# Patient Record
Sex: FEMALE | Race: WHITE | NOT HISPANIC OR LATINO | Employment: FULL TIME | URBAN - METROPOLITAN AREA
[De-identification: names, ages, dates, MRNs, and addresses within clinical notes are randomized per-mention and may not be internally consistent; named-entity substitution may affect disease eponyms.]

---

## 2021-10-01 ENCOUNTER — INITIAL PRENATAL (OUTPATIENT)
Dept: OBSTETRICS AND GYNECOLOGY | Facility: CLINIC | Age: 31
End: 2021-10-01

## 2021-10-01 VITALS — WEIGHT: 175 LBS | DIASTOLIC BLOOD PRESSURE: 72 MMHG | SYSTOLIC BLOOD PRESSURE: 115 MMHG

## 2021-10-01 DIAGNOSIS — Z3A.01 LESS THAN 8 WEEKS GESTATION OF PREGNANCY: Primary | ICD-10-CM

## 2021-10-01 DIAGNOSIS — B00.9 HERPES SIMPLEX TYPE 2 (HSV-2) INFECTION AFFECTING PREGNANCY, ANTEPARTUM: ICD-10-CM

## 2021-10-01 DIAGNOSIS — O98.519 HERPES SIMPLEX TYPE 2 (HSV-2) INFECTION AFFECTING PREGNANCY, ANTEPARTUM: ICD-10-CM

## 2021-10-01 PROBLEM — Z34.90 PREGNANCY: Status: ACTIVE | Noted: 2021-10-01

## 2021-10-01 PROCEDURE — 0501F PRENATAL FLOW SHEET: CPT | Performed by: OBSTETRICS & GYNECOLOGY

## 2021-10-01 RX ORDER — PRENATAL VIT NO.126/IRON/FOLIC 28MG-0.8MG
TABLET ORAL DAILY
COMMUNITY

## 2021-10-01 NOTE — PROGRESS NOTES
Initial ob visit     CC- Here for care of pregnancy        Elma Lima is a 30 y.o. female, , who presents for her first obstetrical visit.  Her last LMP was Patient's last menstrual period was 08/15/2021 (exact date)..    OB History    Para Term  AB Living   1             SAB TAB Ectopic Molar Multiple Live Births                    # Outcome Date GA Lbr Emeterio/2nd Weight Sex Delivery Anes PTL Lv   1 Current                Initial positive test date : 2021, UPT          Prior obstetric issues, potential pregnancy concerns: No  Family history of genetic issues (includes FOB): No  Prior infections concerning in pregnancy (Rash, fever in last 2 weeks): No  Varicella Hx -+  Prior testing for Cystic Fibrosis Carrier or Sickle Cell Trait- No  Prepregnancy BMI - There is no height or weight on file to calculate BMI.  History of STD: HSV  Ultrasound Today: Yes.  Findings showed viable iup.  I have personally evaluated the U/S and agree with the findings. Danya Lim MD    Additional Pertinent History   Last Pap : 2021   Last Completed Pap Smear     This patient has no relevant Health Maintenance data.        History of abnormal Pap smear: no  Family history of uterine, colon, breast, or ovarian cancer: yes - maternal grandmother- breast cancer  Feelings of Anxiety or Depression: no  Tobacco Usage?: No   Alcohol/Drug Use?: NO  Over the age of 35 at delivery: no  Desires Genetic Screening: Cell Free DNA  Flu Status: Declines    PMH  Past Medical History:   Diagnosis Date   • HSV-1 (herpes simplex virus 1) infection        Current Outpatient Medications:   •  prenatal vitamin (prenatal, CLASSIC, vitamin) tablet, Take  by mouth Daily., Disp: , Rfl:     The additional following portions of the patient's history were reviewed and updated as appropriate: allergies, current medications, past family history, past medical history, past social history, past surgical history and problem list.    Review of  Systems   Review of Systems  Current obstetric complaints : Nausea   All systems reviewed and otherwise normal.    I have reviewed and agree with the HPI, ROS, and historical information as entered above. Danya Lim MD    /72   Wt 79.4 kg (175 lb)   LMP 08/15/2021 (Exact Date)     Physical Exam  General:  well developed; well nourished  no acute distress   Chest/Respiratory: No labored breathing, normal respiratory effort, normal appearance, no respiratory noises noted   Heart:  normal rate, regular rhythm,  no murmurs, rubs, or gallops   Thyroid: normal to inspection and palpation   Breasts:  Not performed.   Abdomen: soft, non-tender; no masses  no umbilical or inguinal hernias are present  no hepato-splenomegaly   Pelvis: Not performed.        Assessment and Plan    Problem List Items Addressed This Visit     Herpes simplex type 2 (HSV-2) infection affecting pregnancy, antepartum    Pregnancy - Primary    Relevant Orders    Obstetric Panel    HIV-1 / O / 2 Ag / Antibody 4th Generation    Urine Culture - Urine, Urine, Clean Catch    Chlamydia trachomatis, Neisseria gonorrhoeae, PCR - , Urine, Clean Catch    Urine Drug Screen - Urine, Clean Catch          1. Pregnancy at 6w5d  2. Reviewed routine prenatal care with the office and educational materials given  3. Lab(s) Ordered  4. Patient is on Prenatal vitamins  5. Activity recommendation : 150 minutes/week of moderate intensity aerobic activity unless we limit for bleeding, hypertension or other pregnancy complication   6. plan hsv suppression 36 wks.   Return in about 1 month (around 11/1/2021) for F/U Prenatal.   rec covid vaccine      Danya Lim MD  10/01/2021

## 2021-10-05 LAB
ABO GROUP BLD: NORMAL
AMPHETAMINES UR QL SCN: NEGATIVE NG/ML
BACTERIA UR CULT: NO GROWTH
BACTERIA UR CULT: NORMAL
BARBITURATES UR QL SCN: NEGATIVE NG/ML
BASOPHILS # BLD AUTO: 0.1 X10E3/UL (ref 0–0.2)
BASOPHILS NFR BLD AUTO: 1 %
BENZODIAZ UR QL SCN: NEGATIVE NG/ML
BLD GP AB SCN SERPL QL: NEGATIVE
BZE UR QL SCN: NEGATIVE NG/ML
C TRACH RRNA SPEC QL NAA+PROBE: NEGATIVE
CANNABINOIDS UR QL SCN: NEGATIVE NG/ML
CREAT UR-MCNC: 66.5 MG/DL (ref 20–300)
EOSINOPHIL # BLD AUTO: 0.1 X10E3/UL (ref 0–0.4)
EOSINOPHIL NFR BLD AUTO: 1 %
ERYTHROCYTE [DISTWIDTH] IN BLOOD BY AUTOMATED COUNT: 13.3 % (ref 11.7–15.4)
HBV SURFACE AG SERPL QL IA: NEGATIVE
HCT VFR BLD AUTO: 37.1 % (ref 34–46.6)
HCV AB S/CO SERPL IA: <0.1 S/CO RATIO (ref 0–0.9)
HGB BLD-MCNC: 12.6 G/DL (ref 11.1–15.9)
HIV 1+2 AB+HIV1 P24 AG SERPL QL IA: NON REACTIVE
IMM GRANULOCYTES # BLD AUTO: 0 X10E3/UL (ref 0–0.1)
IMM GRANULOCYTES NFR BLD AUTO: 0 %
LABORATORY COMMENT REPORT: NORMAL
LYMPHOCYTES # BLD AUTO: 2.5 X10E3/UL (ref 0.7–3.1)
LYMPHOCYTES NFR BLD AUTO: 29 %
MCH RBC QN AUTO: 30.5 PG (ref 26.6–33)
MCHC RBC AUTO-ENTMCNC: 34 G/DL (ref 31.5–35.7)
MCV RBC AUTO: 90 FL (ref 79–97)
METHADONE UR QL SCN: NEGATIVE NG/ML
MONOCYTES # BLD AUTO: 0.5 X10E3/UL (ref 0.1–0.9)
MONOCYTES NFR BLD AUTO: 6 %
N GONORRHOEA RRNA SPEC QL NAA+PROBE: NEGATIVE
NEUTROPHILS # BLD AUTO: 5.3 X10E3/UL (ref 1.4–7)
NEUTROPHILS NFR BLD AUTO: 63 %
OPIATES UR QL SCN: NEGATIVE NG/ML
OXYCODONE+OXYMORPHONE UR QL SCN: NEGATIVE NG/ML
PCP UR QL: NEGATIVE NG/ML
PH UR: 7.6 [PH] (ref 4.5–8.9)
PLATELET # BLD AUTO: 302 X10E3/UL (ref 150–450)
PROPOXYPH UR QL SCN: NEGATIVE NG/ML
RBC # BLD AUTO: 4.13 X10E6/UL (ref 3.77–5.28)
RH BLD: NEGATIVE
RPR SER QL: NON REACTIVE
RUBV IGG SERPL IA-ACNC: 3.35 INDEX
WBC # BLD AUTO: 8.4 X10E3/UL (ref 3.4–10.8)

## 2021-10-20 ENCOUNTER — LAB (OUTPATIENT)
Dept: OBSTETRICS AND GYNECOLOGY | Facility: CLINIC | Age: 31
End: 2021-10-20

## 2021-10-20 DIAGNOSIS — Z3A.09 9 WEEKS GESTATION OF PREGNANCY: Primary | ICD-10-CM

## 2021-10-26 LAB
CFDNA.FET/CFDNA.TOTAL SFR FETUS: NORMAL %
CITATION REF LAB TEST: NORMAL
FET 13+18+21+X+Y ANEUP PLAS.CFDNA: NEGATIVE
FET CHR 21 TS PLAS.CFDNA QL: NEGATIVE
FET SEX PLAS.CFDNA DOSAGE CFDNA: NORMAL
FET TS 13 RISK PLAS.CFDNA QL: NEGATIVE
FET TS 18 RISK WBC.DNA+CFDNA QL: NEGATIVE
GA EST FROM CONCEPTION DATE: NORMAL D
GESTATIONAL AGE > 9:: YES
LAB DIRECTOR NAME PROVIDER: NORMAL
LAB DIRECTOR NAME PROVIDER: NORMAL
LABORATORY COMMENT REPORT: NORMAL
LIMITATIONS OF THE TEST: NORMAL
NEGATIVE PREDICTIVE VALUE: NORMAL
NOTE: NORMAL
PERFORMANCE CHARACTERISTICS: NORMAL
POSITIVE PREDICTIVE VALUE: NORMAL
REF LAB TEST METHOD: NORMAL
TEST PERFORMANCE INFO SPEC: NORMAL

## 2021-10-27 ENCOUNTER — ROUTINE PRENATAL (OUTPATIENT)
Dept: OBSTETRICS AND GYNECOLOGY | Facility: CLINIC | Age: 31
End: 2021-10-27

## 2021-10-27 VITALS — WEIGHT: 167 LBS | DIASTOLIC BLOOD PRESSURE: 78 MMHG | SYSTOLIC BLOOD PRESSURE: 120 MMHG

## 2021-10-27 DIAGNOSIS — B00.9 HERPES SIMPLEX TYPE 2 (HSV-2) INFECTION AFFECTING PREGNANCY, ANTEPARTUM: Primary | ICD-10-CM

## 2021-10-27 DIAGNOSIS — Z3A.10 10 WEEKS GESTATION OF PREGNANCY: ICD-10-CM

## 2021-10-27 DIAGNOSIS — O98.519 HERPES SIMPLEX TYPE 2 (HSV-2) INFECTION AFFECTING PREGNANCY, ANTEPARTUM: Primary | ICD-10-CM

## 2021-10-27 LAB
GLUCOSE UR STRIP-MCNC: NEGATIVE MG/DL
PROT UR STRIP-MCNC: NEGATIVE MG/DL

## 2021-10-27 PROCEDURE — 81002 URINALYSIS NONAUTO W/O SCOPE: CPT | Performed by: OBSTETRICS & GYNECOLOGY

## 2021-10-27 NOTE — PROGRESS NOTES
OB FOLLOW UP  CC- Here for care of pregnancy        Elma Lima is a 30 y.o.  10w3d patient being seen today for her obstetrical follow up visit. Patient reports no complaints.  All Labs reviewed, DOES NOT WANT TO KNOW THE SEX OF THE BABY.     Her prenatal care is complicated by (and status) :    Patient Active Problem List   Diagnosis   • Herpes simplex type 2 (HSV-2) infection affecting pregnancy, antepartum   • Pregnancy       Flu Status: Declines  Ultrasound Today: No.    ROS -   Patient Reports : No Problems  Patient Denies: Loss of Fluid, Vaginal Spotting, Vision Changes and Headaches  Fetal Movement : to early  All other systems reviewed and are negative.       The additional following portions of the patient's history were reviewed and updated as appropriate: past social history, past surgical history and problem list.    I have reviewed and agree with the HPI, ROS, and historical information as entered above. Danya Lim MD    /78   Wt 75.8 kg (167 lb)   LMP 08/15/2021 (Exact Date)       EXAM:     FHT: + BPM   Uterine Size: size equals dates  Pelvic Exam: No    Urine glucose/protein: See prenatal flowsheet       Assessment and Plan    Problem List Items Addressed This Visit     Herpes simplex type 2 (HSV-2) infection affecting pregnancy, antepartum - Primary    Overview     Plan suppression 36 wks         Relevant Orders    POC Urinalysis Dipstick (Completed)    Pregnancy    Overview     cfDNA low risk               1. Pregnancy at 10w3d. Prenatal labs reviewed, cfDNA low risk.   2. Fetal status reassuring.   3. Activity and Exercise discussed.  Return in about 1 month (around 2021) for F/U Prenatal.    Danya Lim MD  10/27/2021

## 2021-11-24 ENCOUNTER — ROUTINE PRENATAL (OUTPATIENT)
Dept: OBSTETRICS AND GYNECOLOGY | Facility: CLINIC | Age: 31
End: 2021-11-24

## 2021-11-24 VITALS — SYSTOLIC BLOOD PRESSURE: 110 MMHG | DIASTOLIC BLOOD PRESSURE: 78 MMHG | WEIGHT: 166 LBS

## 2021-11-24 DIAGNOSIS — Z3A.14 14 WEEKS GESTATION OF PREGNANCY: Primary | ICD-10-CM

## 2021-11-24 DIAGNOSIS — O98.519 HERPES SIMPLEX TYPE 2 (HSV-2) INFECTION AFFECTING PREGNANCY, ANTEPARTUM: ICD-10-CM

## 2021-11-24 DIAGNOSIS — B00.9 HERPES SIMPLEX TYPE 2 (HSV-2) INFECTION AFFECTING PREGNANCY, ANTEPARTUM: ICD-10-CM

## 2021-11-24 LAB
GLUCOSE UR STRIP-MCNC: NEGATIVE MG/DL
PROT UR STRIP-MCNC: NEGATIVE MG/DL

## 2021-11-24 PROCEDURE — 0502F SUBSEQUENT PRENATAL CARE: CPT | Performed by: OBSTETRICS & GYNECOLOGY

## 2021-11-24 NOTE — PROGRESS NOTES
OB FOLLOW UP  CC- Here for care of pregnancy        Elma Lima is a 31 y.o.  14w3d patient being seen today for her obstetrical follow up visit. Patient reports no complaints.     Her prenatal care is complicated by (and status) :    Patient Active Problem List   Diagnosis   • Herpes simplex type 2 (HSV-2) infection affecting pregnancy, antepartum   • Pregnancy       Flu Status: Desires at future appt  Ultrasound Today: No.    ROS -   Patient Reports : No Problems  Patient Denies: Loss of Fluid and Vaginal Spotting  Fetal Movement : normal  All other systems reviewed and are negative.       The additional following portions of the patient's history were reviewed and updated as appropriate: allergies and current medications.    I have reviewed and agree with the HPI, ROS, and historical information as entered above. Danya Lim MD    /78   Wt 75.3 kg (166 lb)   LMP 08/15/2021 (Exact Date)       EXAM:     FHT: + BPM   Uterine Size: size equals dates  Pelvic Exam: No    Urine glucose/protein: See prenatal flowsheet       Assessment and Plan    Problem List Items Addressed This Visit     Herpes simplex type 2 (HSV-2) infection affecting pregnancy, antepartum    Overview     Plan suppression 36 wks         Relevant Orders    POC Urinalysis Dipstick (Completed)    Pregnancy - Primary    Overview     cfDNA low risk               1. Pregnancy at 14w3d. cfDNA low risk. Rehana US next time.   2. Fetal status reassuring.   3. Activity and Exercise discussed.  Return in about 5 weeks (around 2021) for F/U Prenatal, U/S Next Visit.    Danya Lim MD  2021

## 2021-12-29 ENCOUNTER — ROUTINE PRENATAL (OUTPATIENT)
Dept: OBSTETRICS AND GYNECOLOGY | Facility: CLINIC | Age: 31
End: 2021-12-29

## 2021-12-29 VITALS — SYSTOLIC BLOOD PRESSURE: 118 MMHG | WEIGHT: 164 LBS | DIASTOLIC BLOOD PRESSURE: 80 MMHG

## 2021-12-29 DIAGNOSIS — B00.9 HERPES SIMPLEX TYPE 2 (HSV-2) INFECTION AFFECTING PREGNANCY, ANTEPARTUM: ICD-10-CM

## 2021-12-29 DIAGNOSIS — O98.519 HERPES SIMPLEX TYPE 2 (HSV-2) INFECTION AFFECTING PREGNANCY, ANTEPARTUM: ICD-10-CM

## 2021-12-29 DIAGNOSIS — Z3A.19 19 WEEKS GESTATION OF PREGNANCY: Primary | ICD-10-CM

## 2021-12-29 LAB
EXPIRATION DATE: 0
GLUCOSE UR STRIP-MCNC: NEGATIVE MG/DL
Lab: 0
PROT UR STRIP-MCNC: NEGATIVE MG/DL

## 2021-12-29 PROCEDURE — 0502F SUBSEQUENT PRENATAL CARE: CPT | Performed by: NURSE PRACTITIONER

## 2021-12-29 NOTE — PROGRESS NOTES
OB FOLLOW UP  CC- Here for care of pregnancy        Elma Lima is a 31 y.o.  19w3d patient being seen today for her obstetrical follow up visit. Patient reports heartburn. Patient taking Tums as needed.     Her prenatal care is complicated by (and status) :    Patient Active Problem List   Diagnosis   • Herpes simplex type 2 (HSV-2) infection affecting pregnancy, antepartum   • Pregnancy       Flu Status: Declines  Ultrasound Today: Yes.  Findings showed wnl.  I have personally evaluated the U/S and agree with the findings. Olena Chand, APRN    ROS -   Patient Reports : No Problems  Patient Denies: Loss of Fluid, Vaginal Spotting, Vision Changes, Headaches, Nausea , Vomiting , Contractions and Epigastric pain  Fetal Movement : Absent   All other systems reviewed and are negative.       The additional following portions of the patient's history were reviewed and updated as appropriate: allergies and current medications.    I have reviewed and agree with the HPI, ROS, and historical information as entered above. Olena Chand, APRN    /80   Wt 74.4 kg (164 lb)   LMP 08/15/2021 (Exact Date)       EXAM:     FHT:  See US      Urine glucose/protein: See prenatal flowsheet       Assessment and Plan    Problem List Items Addressed This Visit        Gravid and     Pregnancy - Primary    Overview     cfDNA low risk         Relevant Orders    POC Glucose, Urine, Qualitative, Dipstick (Completed)    POC Protein, Urine, Qualitative, Dipstick (Completed)       Other    Herpes simplex type 2 (HSV-2) infection affecting pregnancy, antepartum    Overview     Plan suppression 36 wks               1. Pregnancy at 19w3d  2. Fetal status reassuring.   3. Activity and Exercise discussed.  Return in about 4 weeks (around 2022).   Declines AFP only    ZENON Contreras  2021

## 2022-01-26 ENCOUNTER — ROUTINE PRENATAL (OUTPATIENT)
Dept: OBSTETRICS AND GYNECOLOGY | Facility: CLINIC | Age: 32
End: 2022-01-26

## 2022-01-26 VITALS — DIASTOLIC BLOOD PRESSURE: 80 MMHG | WEIGHT: 169.2 LBS | SYSTOLIC BLOOD PRESSURE: 122 MMHG

## 2022-01-26 DIAGNOSIS — O98.519 HERPES SIMPLEX TYPE 2 (HSV-2) INFECTION AFFECTING PREGNANCY, ANTEPARTUM: ICD-10-CM

## 2022-01-26 DIAGNOSIS — B00.9 HERPES SIMPLEX TYPE 2 (HSV-2) INFECTION AFFECTING PREGNANCY, ANTEPARTUM: ICD-10-CM

## 2022-01-26 DIAGNOSIS — Z3A.23 23 WEEKS GESTATION OF PREGNANCY: Primary | ICD-10-CM

## 2022-01-26 LAB
EXPIRATION DATE: 0
GLUCOSE UR STRIP-MCNC: NEGATIVE MG/DL
Lab: 0
PROT UR STRIP-MCNC: NEGATIVE MG/DL

## 2022-01-26 PROCEDURE — 0502F SUBSEQUENT PRENATAL CARE: CPT | Performed by: OBSTETRICS & GYNECOLOGY

## 2022-01-26 NOTE — PROGRESS NOTES
OB FOLLOW UP  CC- Here for care of pregnancy        Elma Lima is a 31 y.o.  23w3d patient being seen today for her obstetrical follow up visit. Patient reports heartburn. Patient is taking Tums for relief. Patient also reports that since last night she had some pain/tension at the top of her belly. Patient states that it woke her from sleep and pain radiated to the middle of her back. Patient states that she applied pressure under her rib cage and that helped relieve some of the tension. She states this lasted about 45 minutes and was an isolated event.     Gave patient 28 week instruction paper.     Her prenatal care is complicated by (and status) :    Patient Active Problem List   Diagnosis   • Herpes simplex type 2 (HSV-2) infection affecting pregnancy, antepartum   • Pregnancy       Flu Status: Declines  Ultrasound Today: No.    ROS -   Patient Reports : No Problems  Patient Denies: Loss of Fluid, Vaginal Spotting, Vision Changes, Headaches, Nausea , Vomiting , Contractions and Epigastric pain  Fetal Movement : normal  All other systems reviewed and are negative.       The additional following portions of the patient's history were reviewed and updated as appropriate: allergies and current medications.    I have reviewed and agree with the HPI, ROS, and historical information as entered above. Danya Lim MD    /80   Wt 76.7 kg (169 lb 3.2 oz)   LMP 08/15/2021 (Exact Date)       EXAM:     FHT: + BPM   Uterine Size: size equals dates  Pelvic Exam: No    Urine glucose/protein: See prenatal flowsheet       Assessment and Plan    Problem List Items Addressed This Visit     Herpes simplex type 2 (HSV-2) infection affecting pregnancy, antepartum    Overview     Plan suppression 36 wks         Pregnancy - Primary    Overview     cfDNA low risk         Relevant Orders    POC Glucose, Urine, Qualitative, Dipstick (Completed)    POC Protein, Urine, Qualitative, Dipstick (Completed)          1. Pregnancy  at 23w3d. glucola next time.   2. Fetal status reassuring.   3. Activity and Exercise discussed.  Return in about 1 month (around 2/26/2022) for F/U Prenatal, and glucola.    Danya Lim MD  01/26/2022

## 2022-02-23 ENCOUNTER — ROUTINE PRENATAL (OUTPATIENT)
Dept: OBSTETRICS AND GYNECOLOGY | Facility: CLINIC | Age: 32
End: 2022-02-23

## 2022-02-23 VITALS — WEIGHT: 168 LBS | SYSTOLIC BLOOD PRESSURE: 120 MMHG | DIASTOLIC BLOOD PRESSURE: 70 MMHG

## 2022-02-23 DIAGNOSIS — O26.893 RH NEGATIVE STATUS DURING PREGNANCY IN THIRD TRIMESTER: Primary | ICD-10-CM

## 2022-02-23 DIAGNOSIS — Z67.91 RH NEGATIVE STATUS DURING PREGNANCY IN THIRD TRIMESTER: Primary | ICD-10-CM

## 2022-02-23 DIAGNOSIS — O98.519 HERPES SIMPLEX TYPE 2 (HSV-2) INFECTION AFFECTING PREGNANCY, ANTEPARTUM: ICD-10-CM

## 2022-02-23 DIAGNOSIS — B00.9 HERPES SIMPLEX TYPE 2 (HSV-2) INFECTION AFFECTING PREGNANCY, ANTEPARTUM: ICD-10-CM

## 2022-02-23 DIAGNOSIS — Z3A.27 27 WEEKS GESTATION OF PREGNANCY: Primary | ICD-10-CM

## 2022-02-23 LAB
GLUCOSE UR STRIP-MCNC: NEGATIVE MG/DL
PROT UR STRIP-MCNC: NEGATIVE MG/DL

## 2022-02-23 PROCEDURE — 96372 THER/PROPH/DIAG INJ SC/IM: CPT | Performed by: NURSE PRACTITIONER

## 2022-02-23 PROCEDURE — 0502F SUBSEQUENT PRENATAL CARE: CPT | Performed by: OBSTETRICS & GYNECOLOGY

## 2022-02-23 NOTE — PROGRESS NOTES
OB FOLLOW UP  CC- Here for care of pregnancy        Elma Lima is a 31 y.o.  27w3d patient being seen today for her obstetrical follow up visit. Patient reports no complaints. GTT/Rhogam today. ? TDAP next visit.     Her prenatal care is complicated by (and status) :    Patient Active Problem List   Diagnosis   • Herpes simplex type 2 (HSV-2) infection affecting pregnancy, antepartum   • Pregnancy       Flu Status: Declines  Ultrasound Today: No.    ROS -   Patient Denies: Loss of Fluid, Vaginal Spotting, Vision Changes, Headaches, Nausea , Vomiting  and Contractions  Fetal Movement : normal  All other systems reviewed and are negative.       The additional following portions of the patient's history were reviewed and updated as appropriate: allergies, current medications, past family history, past medical history, past social history, past surgical history and problem list.    I have reviewed and agree with the HPI, ROS, and historical information as entered above. Danya Lim MD    /70   Wt 76.2 kg (168 lb)   LMP 08/15/2021 (Exact Date)       EXAM:     FHT: + BPM   Uterine Size: size equals dates  Pelvic Exam: No    Urine glucose/protein: See prenatal flowsheet       Assessment and Plan    Problem List Items Addressed This Visit     Herpes simplex type 2 (HSV-2) infection affecting pregnancy, antepartum    Overview     Plan suppression 36 wks         Pregnancy - Primary    Overview     cfDNA low risk         Relevant Orders    Gestational Screen 1 Hr ([facility])    CBC (No Diff)    Antibody Screen    POC Urinalysis Dipstick (Completed)          1. Pregnancy at 27w3d. Glucola, rhogam today. tdap next time.   2. FH normal, but no weight gain this pregnancy, will cont to monitor.   3. Fetal status reassuring.   4. Activity and Exercise discussed.  Return in about 1 month (around 3/23/2022) for F/U Prenatal.    Danya Lim MD  2022

## 2022-02-24 LAB
BLD GP AB SCN SERPL QL: NEGATIVE
ERYTHROCYTE [DISTWIDTH] IN BLOOD BY AUTOMATED COUNT: 12.6 % (ref 11.7–15.4)
GLUCOSE 1H P 50 G GLC PO SERPL-MCNC: 130 MG/DL (ref 65–139)
HCT VFR BLD AUTO: 33.2 % (ref 34–46.6)
HGB BLD-MCNC: 11.2 G/DL (ref 11.1–15.9)
MCH RBC QN AUTO: 31.6 PG (ref 26.6–33)
MCHC RBC AUTO-ENTMCNC: 33.7 G/DL (ref 31.5–35.7)
MCV RBC AUTO: 94 FL (ref 79–97)
PLATELET # BLD AUTO: 269 X10E3/UL (ref 150–450)
RBC # BLD AUTO: 3.54 X10E6/UL (ref 3.77–5.28)
WBC # BLD AUTO: 10.8 X10E3/UL (ref 3.4–10.8)

## 2022-03-23 ENCOUNTER — ROUTINE PRENATAL (OUTPATIENT)
Dept: OBSTETRICS AND GYNECOLOGY | Facility: CLINIC | Age: 32
End: 2022-03-23

## 2022-03-23 VITALS — SYSTOLIC BLOOD PRESSURE: 120 MMHG | WEIGHT: 170 LBS | DIASTOLIC BLOOD PRESSURE: 80 MMHG

## 2022-03-23 DIAGNOSIS — Z3A.31 31 WEEKS GESTATION OF PREGNANCY: ICD-10-CM

## 2022-03-23 DIAGNOSIS — O26.10 LOW WEIGHT GAIN DURING PREGNANCY, ANTEPARTUM: ICD-10-CM

## 2022-03-23 DIAGNOSIS — B00.9 HERPES SIMPLEX TYPE 2 (HSV-2) INFECTION AFFECTING PREGNANCY, ANTEPARTUM: Primary | ICD-10-CM

## 2022-03-23 DIAGNOSIS — O98.519 HERPES SIMPLEX TYPE 2 (HSV-2) INFECTION AFFECTING PREGNANCY, ANTEPARTUM: Primary | ICD-10-CM

## 2022-03-23 LAB
GLUCOSE UR STRIP-MCNC: NEGATIVE MG/DL
PROT UR STRIP-MCNC: NEGATIVE MG/DL

## 2022-03-23 PROCEDURE — 0502F SUBSEQUENT PRENATAL CARE: CPT | Performed by: OBSTETRICS & GYNECOLOGY

## 2022-03-23 PROCEDURE — 90471 IMMUNIZATION ADMIN: CPT | Performed by: OBSTETRICS & GYNECOLOGY

## 2022-03-23 PROCEDURE — 90715 TDAP VACCINE 7 YRS/> IM: CPT | Performed by: OBSTETRICS & GYNECOLOGY

## 2022-03-23 NOTE — PROGRESS NOTES
OB FOLLOW UP  CC- Here for care of pregnancy        Elma Lima is a 31 y.o.  31w3d patient being seen today for her obstetrical follow up visit. Patient reports no complaints. 28wk labs reviewed. TDAP given today.      Her prenatal care is complicated by (and status) :    Patient Active Problem List   Diagnosis   • Herpes simplex type 2 (HSV-2) infection affecting pregnancy, antepartum   • Pregnancy         Ultrasound Today: No.    ROS -     Patient Denies: Loss of Fluid, Vaginal Spotting, Vision Changes, Headaches, Nausea , Vomiting  and Contractions  Fetal Movement : normal  All other systems reviewed and are negative.       The additional following portions of the patient's history were reviewed and updated as appropriate: allergies, current medications, past family history, past medical history, past social history, past surgical history and problem list.    I have reviewed and agree with the HPI, ROS, and historical information as entered above. Danya Lim MD    /80   Wt 77.1 kg (170 lb)   LMP 08/15/2021 (Exact Date)       EXAM:     FHT: + BPM   Uterine Size: size equals dates  Pelvic Exam: No    Urine glucose/protein: See prenatal flowsheet       Assessment and Plan    Problem List Items Addressed This Visit     Herpes simplex type 2 (HSV-2) infection affecting pregnancy, antepartum - Primary    Overview     Plan suppression 36 wks           Pregnancy    Overview     cfDNA low risk           Relevant Orders    POC Urinalysis Dipstick (Completed)      Other Visit Diagnoses     Low weight gain during pregnancy, antepartum              1. Pregnancy at 31w3d. Check growth next time for low maternal weight gain.   2. Fetal status reassuring.   3. Activity and Exercise discussed.  Return in about 2 weeks (around 2022) for F/U Prenatal, U/S Next Visit.    Danya Lim MD  2022

## 2022-04-06 ENCOUNTER — ROUTINE PRENATAL (OUTPATIENT)
Dept: OBSTETRICS AND GYNECOLOGY | Facility: CLINIC | Age: 32
End: 2022-04-06

## 2022-04-06 VITALS — SYSTOLIC BLOOD PRESSURE: 128 MMHG | WEIGHT: 173.8 LBS | DIASTOLIC BLOOD PRESSURE: 78 MMHG

## 2022-04-06 DIAGNOSIS — Z3A.33 33 WEEKS GESTATION OF PREGNANCY: Primary | ICD-10-CM

## 2022-04-06 DIAGNOSIS — O98.519 HERPES SIMPLEX TYPE 2 (HSV-2) INFECTION AFFECTING PREGNANCY, ANTEPARTUM: ICD-10-CM

## 2022-04-06 DIAGNOSIS — B00.9 HERPES SIMPLEX TYPE 2 (HSV-2) INFECTION AFFECTING PREGNANCY, ANTEPARTUM: ICD-10-CM

## 2022-04-06 LAB
EXPIRATION DATE: 0
GLUCOSE UR STRIP-MCNC: NEGATIVE MG/DL
Lab: 0
PROT UR STRIP-MCNC: NEGATIVE MG/DL

## 2022-04-06 PROCEDURE — 0502F SUBSEQUENT PRENATAL CARE: CPT | Performed by: OBSTETRICS & GYNECOLOGY

## 2022-04-06 NOTE — PROGRESS NOTES
OB FOLLOW UP  CC- Here for care of pregnancy        Elma Lima is a 31 y.o.  33w3d patient being seen today for her obstetrical follow up visit. Patient reports no complaints.     Her prenatal care is complicated by (and status) :    Patient Active Problem List   Diagnosis   • Herpes simplex type 2 (HSV-2) infection affecting pregnancy, antepartum   • Pregnancy     Ultrasound Today: Yes.  Findings showed nl growth and fluid.  I have personally evaluated the U/S and agree with the findings.     ROS -   Patient Reports : No Problems  Patient Denies: Loss of Fluid, Vaginal Spotting, Vision Changes, Headaches, Nausea , Vomiting , Contractions and Epigastric pain  Fetal Movement : normal  All other systems reviewed and are negative.       The additional following portions of the patient's history were reviewed and updated as appropriate: allergies and current medications.    I have reviewed and agree with the HPI, ROS, and historical information as entered above. Danya Lim MD    /78   Wt 78.8 kg (173 lb 12.8 oz)   LMP 08/15/2021 (Exact Date)       EXAM:     FHT: + BPM   Uterine Size: size equals dates  Pelvic Exam: No    Urine glucose/protein: See prenatal flowsheet       Assessment and Plan    Problem List Items Addressed This Visit     Herpes simplex type 2 (HSV-2) infection affecting pregnancy, antepartum    Overview     Plan suppression 36 wks           Pregnancy - Primary    Overview     cfDNA low risk  EFW 33 wks 63%ile           Relevant Orders    POC Glucose, Urine, Qualitative, Dipstick (Completed)    POC Protein, Urine, Qualitative, Dipstick (Completed)          1. Pregnancy at 33w3d. US today EFW 63%ile. Vtx, RICHA nl.   2. Fetal status reassuring.   3. Activity and Exercise discussed.  Return in about 2 weeks (around 2022) for F/U Prenatal.    Danya Lim MD  2022

## 2022-04-20 ENCOUNTER — ROUTINE PRENATAL (OUTPATIENT)
Dept: OBSTETRICS AND GYNECOLOGY | Facility: CLINIC | Age: 32
End: 2022-04-20

## 2022-04-20 VITALS — SYSTOLIC BLOOD PRESSURE: 122 MMHG | DIASTOLIC BLOOD PRESSURE: 78 MMHG | WEIGHT: 177 LBS

## 2022-04-20 DIAGNOSIS — Z3A.35 35 WEEKS GESTATION OF PREGNANCY: Primary | ICD-10-CM

## 2022-04-20 DIAGNOSIS — B00.9 HERPES SIMPLEX TYPE 2 (HSV-2) INFECTION AFFECTING PREGNANCY, ANTEPARTUM: ICD-10-CM

## 2022-04-20 DIAGNOSIS — O98.519 HERPES SIMPLEX TYPE 2 (HSV-2) INFECTION AFFECTING PREGNANCY, ANTEPARTUM: ICD-10-CM

## 2022-04-20 LAB
GLUCOSE UR STRIP-MCNC: NEGATIVE MG/DL
PROT UR STRIP-MCNC: NEGATIVE MG/DL

## 2022-04-20 PROCEDURE — 0502F SUBSEQUENT PRENATAL CARE: CPT | Performed by: NURSE PRACTITIONER

## 2022-04-20 NOTE — PROGRESS NOTES
OB FOLLOW UP  CC- Here for care of pregnancy        Elma Lima is a 31 y.o.  35w3d patient being seen today for her obstetrical follow up visit. Patient reports contractions but not consistent .     Her prenatal care is complicated by (and status) :    Patient Active Problem List   Diagnosis   • Herpes simplex type 2 (HSV-2) infection affecting pregnancy, antepartum   • Pregnancy       Flu Status: Desires at future appt  Ultrasound Today: No.    ROS -   Patient Reports : Contractions  Patient Denies: Loss of Fluid, Vaginal Spotting, Vision Changes, Headaches, Nausea  and Vomiting   Fetal Movement : normal  All other systems reviewed and are negative.       The additional following portions of the patient's history were reviewed and updated as appropriate: allergies and current medications.    I have reviewed and agree with the HPI, ROS, and historical information as entered above. Olena Chand, APRN    /78   Wt 80.3 kg (177 lb)   LMP 08/15/2021 (Exact Date)       EXAM:     FHT: wnl BPM   Uterine Size:  35 cm  Pelvic Exam:   deferred    Urine glucose/protein: See prenatal flowsheet       Assessment and Plan    Problem List Items Addressed This Visit        Gravid and     Pregnancy - Primary    Overview     cfDNA low risk  EFW 33 wks 63%ile           Relevant Orders    POC Urinalysis Dipstick (Completed)       Other    Herpes simplex type 2 (HSV-2) infection affecting pregnancy, antepartum    Overview     Plan suppression 36 wks                 1. Pregnancy at 35w3d  2. Fetal status reassuring.   3. Activity and Exercise discussed.  Return in about 1 week (around 2022).    Olena Chand, ZENON  2022

## 2022-04-27 ENCOUNTER — ROUTINE PRENATAL (OUTPATIENT)
Dept: OBSTETRICS AND GYNECOLOGY | Facility: CLINIC | Age: 32
End: 2022-04-27

## 2022-04-27 VITALS — DIASTOLIC BLOOD PRESSURE: 80 MMHG | SYSTOLIC BLOOD PRESSURE: 122 MMHG | WEIGHT: 176.6 LBS

## 2022-04-27 DIAGNOSIS — Z3A.36 36 WEEKS GESTATION OF PREGNANCY: ICD-10-CM

## 2022-04-27 DIAGNOSIS — O98.519 HERPES SIMPLEX TYPE 2 (HSV-2) INFECTION AFFECTING PREGNANCY, ANTEPARTUM: Primary | ICD-10-CM

## 2022-04-27 DIAGNOSIS — B00.9 HERPES SIMPLEX TYPE 2 (HSV-2) INFECTION AFFECTING PREGNANCY, ANTEPARTUM: Primary | ICD-10-CM

## 2022-04-27 LAB
GLUCOSE UR STRIP-MCNC: NEGATIVE MG/DL
PROT UR STRIP-MCNC: NEGATIVE MG/DL

## 2022-04-27 PROCEDURE — 0502F SUBSEQUENT PRENATAL CARE: CPT | Performed by: OBSTETRICS & GYNECOLOGY

## 2022-04-27 RX ORDER — VALACYCLOVIR HYDROCHLORIDE 500 MG/1
500 TABLET, FILM COATED ORAL 2 TIMES DAILY
Qty: 60 TABLET | Refills: 0 | Status: SHIPPED | OUTPATIENT
Start: 2022-04-27 | End: 2022-05-16

## 2022-04-27 NOTE — PROGRESS NOTES
OB FOLLOW UP  CC- Here for care of pregnancy        Elma Lima is a 31 y.o.  36w3d patient being seen today for her obstetrical follow up visit. Patient reports occasional joy reynoso when she tends to be active atleast once daily. She states that they subside with rest. Patient has history of HSV.     Her prenatal care is complicated by (and status) :    Patient Active Problem List   Diagnosis   • Herpes simplex type 2 (HSV-2) infection affecting pregnancy, antepartum   • Pregnancy       GBS Status: Done Today  Her Delivery Plan is: Does not desire IOL  Ultrasound Today: No.    ROS -   Patient Denies: Vaginal Spotting, Vision Changes, Headaches, Nausea  and Vomiting   Fetal Movement : normal  All other systems reviewed and are negative.       The additional following portions of the patient's history were reviewed and updated as appropriate: allergies, current medications, past family history, past medical history, past social history, past surgical history and problem list.    I have reviewed and agree with the HPI, ROS, and historical information as entered above. Danya Lim MD        /80   Wt 80.1 kg (176 lb 9.6 oz)   LMP 08/15/2021 (Exact Date)     EXAM:     FHT: + BPM   Uterine Size: size equals dates  Pelvic Exam:     Urine glucose/protein: See prenatal flowsheet       Assessment and Plan    Problem List Items Addressed This Visit     Herpes simplex type 2 (HSV-2) infection affecting pregnancy, antepartum - Primary    Overview     Plan suppression 36 wks           Pregnancy    Overview     cfDNA low risk  EFW 33 wks 63%ile           Relevant Orders    POC Urinalysis Dipstick (Completed)    Strep B Screen - Swab, Vaginal/Rectum          1. Pregnancy at 36w3d. Start hsv suppression.   2. Fetal status reassuring.   3. Activity and Exercise discussed.  Return in about 1 week (around 2022) for F/U Prenatal.    Danya Lim MD  2022

## 2022-04-29 LAB — GP B STREP DNA SPEC QL NAA+PROBE: POSITIVE

## 2022-05-04 ENCOUNTER — ROUTINE PRENATAL (OUTPATIENT)
Dept: OBSTETRICS AND GYNECOLOGY | Facility: CLINIC | Age: 32
End: 2022-05-04

## 2022-05-04 VITALS — WEIGHT: 175.4 LBS | DIASTOLIC BLOOD PRESSURE: 84 MMHG | SYSTOLIC BLOOD PRESSURE: 118 MMHG

## 2022-05-04 DIAGNOSIS — Z3A.37 37 WEEKS GESTATION OF PREGNANCY: Primary | ICD-10-CM

## 2022-05-04 LAB
EXPIRATION DATE: 0
GLUCOSE UR STRIP-MCNC: NEGATIVE MG/DL
Lab: 0
PROT UR STRIP-MCNC: ABNORMAL MG/DL

## 2022-05-04 PROCEDURE — 0502F SUBSEQUENT PRENATAL CARE: CPT | Performed by: NURSE PRACTITIONER

## 2022-05-04 RX ORDER — VALACYCLOVIR HYDROCHLORIDE 1 G/1
TABLET, FILM COATED ORAL
COMMUNITY
Start: 2022-05-03 | End: 2022-05-16

## 2022-05-04 NOTE — PROGRESS NOTES
OB FOLLOW UP  CC- Here for care of pregnancy        Elma Lima is a 31 y.o.  37w3d patient being seen today for her obstetrical follow up visit. Patient reports vaginal spotting for 2 days after her last appt when she had her cervix checked. Patient denies any bleeding since that time. Patient also reports BH contractions. Patient reports that she lost her mucus plug this past Saturday.     Patient declined cervix checked today.     Her prenatal care is complicated by (and status) :    Patient Active Problem List   Diagnosis   • Herpes simplex type 2 (HSV-2) infection affecting pregnancy, antepartum   • Pregnancy     GBS Status: Was already done and it was positive.   Her Delivery Plan is: Does not desire IOL  Ultrasound Today: No.    ROS -   Patient Reports : Vaginal Spotting, Cramping and Contractions- BH   Patient Denies: Loss of Fluid, Vision Changes, Headaches, Nausea , Vomiting  and Epigastric pain  Fetal Movement : normal  All other systems reviewed and are negative.       The additional following portions of the patient's history were reviewed and updated as appropriate: allergies and current medications.    I have reviewed and agree with the HPI, ROS, and historical information as entered above. Olena Chand, APRN        /84   Wt 79.6 kg (175 lb 6.4 oz)   LMP 08/15/2021 (Exact Date)     EXAM:     FHT: wnl BPM   Uterine Size: 37 cm  Pelvic Exam: declined    Urine glucose/protein: See prenatal flowsheet       Assessment and Plan    Problem List Items Addressed This Visit        Gravid and     Pregnancy - Primary    Overview     cfDNA low risk  EFW 33 wks 63%ile           Relevant Orders    POC Glucose, Urine, Qualitative, Dipstick (Completed)    POC Protein, Urine, Qualitative, Dipstick (Completed)          1. Pregnancy at 37w3d  2. Fetal status reassuring.   3. Activity and Exercise discussed.  RTO one week and prn  Olena Chand APRN  2022

## 2022-05-11 ENCOUNTER — ROUTINE PRENATAL (OUTPATIENT)
Dept: OBSTETRICS AND GYNECOLOGY | Facility: CLINIC | Age: 32
End: 2022-05-11

## 2022-05-11 VITALS — WEIGHT: 176.6 LBS | SYSTOLIC BLOOD PRESSURE: 128 MMHG | DIASTOLIC BLOOD PRESSURE: 88 MMHG

## 2022-05-11 DIAGNOSIS — B00.9 HERPES SIMPLEX TYPE 2 (HSV-2) INFECTION AFFECTING PREGNANCY, ANTEPARTUM: ICD-10-CM

## 2022-05-11 DIAGNOSIS — O98.519 HERPES SIMPLEX TYPE 2 (HSV-2) INFECTION AFFECTING PREGNANCY, ANTEPARTUM: ICD-10-CM

## 2022-05-11 DIAGNOSIS — Z3A.38 38 WEEKS GESTATION OF PREGNANCY: Primary | ICD-10-CM

## 2022-05-11 LAB
EXPIRATION DATE: 0
GLUCOSE UR STRIP-MCNC: NEGATIVE MG/DL
Lab: 0
PROT UR STRIP-MCNC: NEGATIVE MG/DL

## 2022-05-11 PROCEDURE — 0502F SUBSEQUENT PRENATAL CARE: CPT | Performed by: OBSTETRICS & GYNECOLOGY

## 2022-05-11 NOTE — PROGRESS NOTES
OB FOLLOW UP  CC- Here for care of pregnancy        Elma Lima is a 31 y.o.  38w3d patient being seen today for her obstetrical follow up visit. Patient reports BH contractions .     Patient would like cervix checked today.     Her prenatal care is complicated by (and status) :    Patient Active Problem List   Diagnosis   • Herpes simplex type 2 (HSV-2) infection affecting pregnancy, antepartum   • Pregnancy     GBS Status: Was already done and it was positive.   Her Delivery Plan is: Does not desire IOL  Ultrasound Today: No.    ROS -   Patient Reports : Cramping and Contractions - BH   Patient Denies: Loss of Fluid, Vaginal Spotting, Vision Changes, Headaches, Nausea , Vomiting  and Epigastric pain  Fetal Movement : normal  All other systems reviewed and are negative.       The additional following portions of the patient's history were reviewed and updated as appropriate: allergies and current medications.    I have reviewed and agree with the HPI, ROS, and historical information as entered above. Danya Lim MD        /88   Wt 80.1 kg (176 lb 9.6 oz)   LMP 08/15/2021 (Exact Date)     EXAM:     FHT: + BPM   Uterine Size: size equals dates  Pelvic Exam:     Urine glucose/protein: See prenatal flowsheet       Assessment and Plan    Problem List Items Addressed This Visit     Herpes simplex type 2 (HSV-2) infection affecting pregnancy, antepartum    Overview     Plan suppression 36 wks           Relevant Medications    valACYclovir (Valtrex) 500 MG tablet    valACYclovir (VALTREX) 1000 MG tablet    Pregnancy - Primary    Overview     cfDNA low risk  EFW 33 wks 63%ile           Relevant Orders    POC Glucose, Urine, Qualitative, Dipstick (Completed)    POC Protein, Urine, Qualitative, Dipstick (Completed)          1. Pregnancy at 38w3d.   2. Desires to sched induction.   3. Fetal status reassuring.   4. Activity and Exercise discussed.  Return in about 1 week (around 2022) for F/U  Prenatal.    Danya Lim MD  05/11/2022

## 2022-05-13 ENCOUNTER — HOSPITAL ENCOUNTER (INPATIENT)
Facility: HOSPITAL | Age: 32
LOS: 2 days | Discharge: HOME | End: 2022-05-16
Attending: OBSTETRICS & GYNECOLOGY | Admitting: OBSTETRICS & GYNECOLOGY

## 2022-05-13 LAB
BASOPHILS # BLD AUTO: 0.04 10*3/MM3 (ref 0–0.2)
BASOPHILS NFR BLD AUTO: 0.3 % (ref 0–1.5)
DEPRECATED RDW RBC AUTO: 43.8 FL (ref 37–54)
EOSINOPHIL # BLD AUTO: 0.17 10*3/MM3 (ref 0–0.4)
EOSINOPHIL NFR BLD AUTO: 1.5 % (ref 0.3–6.2)
ERYTHROCYTE [DISTWIDTH] IN BLOOD BY AUTOMATED COUNT: 13.6 % (ref 12.3–15.4)
EXPIRATION DATE: ABNORMAL
HCT VFR BLD AUTO: 31.1 % (ref 34–46.6)
HGB BLD-MCNC: 10.8 G/DL (ref 12–15.9)
IMM GRANULOCYTES # BLD AUTO: 0.07 10*3/MM3 (ref 0–0.05)
IMM GRANULOCYTES NFR BLD AUTO: 0.6 % (ref 0–0.5)
LYMPHOCYTES # BLD AUTO: 2.42 10*3/MM3 (ref 0.7–3.1)
LYMPHOCYTES NFR BLD AUTO: 20.6 % (ref 19.6–45.3)
Lab: ABNORMAL
MCH RBC QN AUTO: 30.7 PG (ref 26.6–33)
MCHC RBC AUTO-ENTMCNC: 34.7 G/DL (ref 31.5–35.7)
MCV RBC AUTO: 88.4 FL (ref 79–97)
MONOCYTES # BLD AUTO: 0.98 10*3/MM3 (ref 0.1–0.9)
MONOCYTES NFR BLD AUTO: 8.4 % (ref 5–12)
NEUTROPHILS NFR BLD AUTO: 68.6 % (ref 42.7–76)
NEUTROPHILS NFR BLD AUTO: 8.04 10*3/MM3 (ref 1.7–7)
NRBC BLD AUTO-RTO: 0 /100 WBC (ref 0–0.2)
PLATELET # BLD AUTO: 213 10*3/MM3 (ref 140–450)
PMV BLD AUTO: 10.9 FL (ref 6–12)
POC AMNISURE: NORMAL
PROT UR STRIP-MCNC: ABNORMAL MG/DL
RBC # BLD AUTO: 3.52 10*6/MM3 (ref 3.77–5.28)
WBC NRBC COR # BLD: 11.72 10*3/MM3 (ref 3.4–10.8)

## 2022-05-13 PROCEDURE — 84460 ALANINE AMINO (ALT) (SGPT): CPT | Performed by: OBSTETRICS & GYNECOLOGY

## 2022-05-13 PROCEDURE — 82247 BILIRUBIN TOTAL: CPT | Performed by: OBSTETRICS & GYNECOLOGY

## 2022-05-13 PROCEDURE — 83615 LACTATE (LD) (LDH) ENZYME: CPT | Performed by: OBSTETRICS & GYNECOLOGY

## 2022-05-13 PROCEDURE — 85025 COMPLETE CBC W/AUTO DIFF WBC: CPT | Performed by: OBSTETRICS & GYNECOLOGY

## 2022-05-13 PROCEDURE — 82565 ASSAY OF CREATININE: CPT | Performed by: OBSTETRICS & GYNECOLOGY

## 2022-05-13 PROCEDURE — 84112 EVAL AMNIOTIC FLUID PROTEIN: CPT | Performed by: OBSTETRICS & GYNECOLOGY

## 2022-05-13 PROCEDURE — 84075 ASSAY ALKALINE PHOSPHATASE: CPT | Performed by: OBSTETRICS & GYNECOLOGY

## 2022-05-13 PROCEDURE — 84450 TRANSFERASE (AST) (SGOT): CPT | Performed by: OBSTETRICS & GYNECOLOGY

## 2022-05-13 PROCEDURE — 59025 FETAL NON-STRESS TEST: CPT

## 2022-05-13 PROCEDURE — 81002 URINALYSIS NONAUTO W/O SCOPE: CPT | Performed by: OBSTETRICS & GYNECOLOGY

## 2022-05-13 PROCEDURE — 36415 COLL VENOUS BLD VENIPUNCTURE: CPT | Performed by: OBSTETRICS & GYNECOLOGY

## 2022-05-13 PROCEDURE — 84550 ASSAY OF BLOOD/URIC ACID: CPT | Performed by: OBSTETRICS & GYNECOLOGY

## 2022-05-14 ENCOUNTER — ANESTHESIA EVENT (OUTPATIENT)
Dept: LABOR AND DELIVERY | Facility: HOSPITAL | Age: 32
End: 2022-05-14

## 2022-05-14 ENCOUNTER — ANESTHESIA (OUTPATIENT)
Dept: LABOR AND DELIVERY | Facility: HOSPITAL | Age: 32
End: 2022-05-14

## 2022-05-14 PROBLEM — O13.3 GESTATIONAL HYPERTENSION WITHOUT SIGNIFICANT PROTEINURIA DURING PREGNANCY IN THIRD TRIMESTER, ANTEPARTUM: Status: ACTIVE | Noted: 2022-05-14

## 2022-05-14 LAB
ABO GROUP BLD: NORMAL
ALP SERPL-CCNC: 137 U/L (ref 39–117)
ALT SERPL W P-5'-P-CCNC: 7 U/L (ref 1–33)
AST SERPL-CCNC: 16 U/L (ref 1–32)
BILIRUB SERPL-MCNC: 0.2 MG/DL (ref 0–1.2)
BLD GP AB SCN SERPL QL: POSITIVE
CREAT SERPL-MCNC: 0.62 MG/DL (ref 0.57–1)
FETAL BLEED: NEGATIVE
LDH SERPL-CCNC: 268 U/L (ref 135–214)
NUMBER OF DOSES: NORMAL
RESIDUAL RHIG DETECTED: NORMAL
RH BLD: NEGATIVE
SARS-COV-2 RDRP RESP QL NAA+PROBE: NORMAL
T&S EXPIRATION DATE: NORMAL
URATE SERPL-MCNC: 3.2 MG/DL (ref 2.4–5.7)

## 2022-05-14 PROCEDURE — 86900 BLOOD TYPING SEROLOGIC ABO: CPT

## 2022-05-14 PROCEDURE — 86900 BLOOD TYPING SEROLOGIC ABO: CPT | Performed by: OBSTETRICS & GYNECOLOGY

## 2022-05-14 PROCEDURE — 10907ZC DRAINAGE OF AMNIOTIC FLUID, THERAPEUTIC FROM PRODUCTS OF CONCEPTION, VIA NATURAL OR ARTIFICIAL OPENING: ICD-10-PCS | Performed by: OBSTETRICS & GYNECOLOGY

## 2022-05-14 PROCEDURE — 86870 RBC ANTIBODY IDENTIFICATION: CPT | Performed by: OBSTETRICS & GYNECOLOGY

## 2022-05-14 PROCEDURE — 86850 RBC ANTIBODY SCREEN: CPT | Performed by: OBSTETRICS & GYNECOLOGY

## 2022-05-14 PROCEDURE — 86901 BLOOD TYPING SEROLOGIC RH(D): CPT

## 2022-05-14 PROCEDURE — 87635 SARS-COV-2 COVID-19 AMP PRB: CPT | Performed by: OBSTETRICS & GYNECOLOGY

## 2022-05-14 PROCEDURE — 86901 BLOOD TYPING SEROLOGIC RH(D): CPT | Performed by: OBSTETRICS & GYNECOLOGY

## 2022-05-14 PROCEDURE — 0KQM0ZZ REPAIR PERINEUM MUSCLE, OPEN APPROACH: ICD-10-PCS | Performed by: OBSTETRICS & GYNECOLOGY

## 2022-05-14 PROCEDURE — 59025 FETAL NON-STRESS TEST: CPT

## 2022-05-14 PROCEDURE — 25010000002 FENTANYL CITRATE (PF) 50 MCG/ML SOLUTION: Performed by: ANESTHESIOLOGY

## 2022-05-14 PROCEDURE — 0 PENICILLIN G POTASSIUM PER 600000 UNITS: Performed by: OBSTETRICS & GYNECOLOGY

## 2022-05-14 PROCEDURE — 25010000002 ROPIVACAINE PER 1 MG: Performed by: ANESTHESIOLOGY

## 2022-05-14 PROCEDURE — C1755 CATHETER, INTRASPINAL: HCPCS | Performed by: ANESTHESIOLOGY

## 2022-05-14 PROCEDURE — 59400 OBSTETRICAL CARE: CPT | Performed by: OBSTETRICS & GYNECOLOGY

## 2022-05-14 PROCEDURE — S0260 H&P FOR SURGERY: HCPCS | Performed by: OBSTETRICS & GYNECOLOGY

## 2022-05-14 PROCEDURE — 25010000002 ONDANSETRON PER 1 MG: Performed by: OBSTETRICS & GYNECOLOGY

## 2022-05-14 PROCEDURE — 25010000002 RHO D IMMUNE GLOBULIN 1500 UNIT/2ML SOLUTION PREFILLED SYRINGE: Performed by: OBSTETRICS & GYNECOLOGY

## 2022-05-14 PROCEDURE — 25010000002 PENICILLIN G POTASSIUM PER 600000 UNITS: Performed by: OBSTETRICS & GYNECOLOGY

## 2022-05-14 PROCEDURE — 85461 HEMOGLOBIN FETAL: CPT | Performed by: OBSTETRICS & GYNECOLOGY

## 2022-05-14 RX ORDER — LIDOCAINE HYDROCHLORIDE 10 MG/ML
5 INJECTION, SOLUTION EPIDURAL; INFILTRATION; INTRACAUDAL; PERINEURAL AS NEEDED
Status: DISCONTINUED | OUTPATIENT
Start: 2022-05-14 | End: 2022-05-14 | Stop reason: HOSPADM

## 2022-05-14 RX ORDER — DIPHENHYDRAMINE HCL 25 MG
25 CAPSULE ORAL NIGHTLY PRN
Status: DISCONTINUED | OUTPATIENT
Start: 2022-05-14 | End: 2022-05-16 | Stop reason: HOSPADM

## 2022-05-14 RX ORDER — EPHEDRINE SULFATE 5 MG/ML
10 INJECTION INTRAVENOUS
Status: DISCONTINUED | OUTPATIENT
Start: 2022-05-14 | End: 2022-05-14 | Stop reason: HOSPADM

## 2022-05-14 RX ORDER — PRENATAL VIT/IRON FUM/FOLIC AC 27MG-0.8MG
1 TABLET ORAL DAILY
Status: DISCONTINUED | OUTPATIENT
Start: 2022-05-14 | End: 2022-05-16 | Stop reason: HOSPADM

## 2022-05-14 RX ORDER — MAGNESIUM CARB/ALUMINUM HYDROX 105-160MG
30 TABLET,CHEWABLE ORAL ONCE
Status: COMPLETED | OUTPATIENT
Start: 2022-05-14 | End: 2022-05-14

## 2022-05-14 RX ORDER — CARBOPROST TROMETHAMINE 250 UG/ML
250 INJECTION, SOLUTION INTRAMUSCULAR AS NEEDED
Status: DISCONTINUED | OUTPATIENT
Start: 2022-05-14 | End: 2022-05-14 | Stop reason: HOSPADM

## 2022-05-14 RX ORDER — ONDANSETRON 2 MG/ML
4 INJECTION INTRAMUSCULAR; INTRAVENOUS ONCE AS NEEDED
Status: DISCONTINUED | OUTPATIENT
Start: 2022-05-14 | End: 2022-05-14 | Stop reason: HOSPADM

## 2022-05-14 RX ORDER — BUTORPHANOL TARTRATE 1 MG/ML
1 INJECTION, SOLUTION INTRAMUSCULAR; INTRAVENOUS
Status: DISCONTINUED | OUTPATIENT
Start: 2022-05-14 | End: 2022-05-14 | Stop reason: HOSPADM

## 2022-05-14 RX ORDER — BUPIVACAINE HYDROCHLORIDE 2.5 MG/ML
INJECTION, SOLUTION EPIDURAL; INFILTRATION; INTRACAUDAL AS NEEDED
Status: DISCONTINUED | OUTPATIENT
Start: 2022-05-14 | End: 2022-05-14 | Stop reason: SURG

## 2022-05-14 RX ORDER — SODIUM CHLORIDE 0.9 % (FLUSH) 0.9 %
10 SYRINGE (ML) INJECTION AS NEEDED
Status: DISCONTINUED | OUTPATIENT
Start: 2022-05-14 | End: 2022-05-14 | Stop reason: HOSPADM

## 2022-05-14 RX ORDER — METHYLERGONOVINE MALEATE 0.2 MG/ML
200 INJECTION INTRAVENOUS ONCE AS NEEDED
Status: DISCONTINUED | OUTPATIENT
Start: 2022-05-14 | End: 2022-05-14 | Stop reason: HOSPADM

## 2022-05-14 RX ORDER — ERYTHROMYCIN 5 MG/G
OINTMENT OPHTHALMIC
Status: DISCONTINUED
Start: 2022-05-14 | End: 2022-05-16 | Stop reason: HOSPADM

## 2022-05-14 RX ORDER — SODIUM CHLORIDE 0.9 % (FLUSH) 0.9 %
3 SYRINGE (ML) INJECTION EVERY 12 HOURS SCHEDULED
Status: DISCONTINUED | OUTPATIENT
Start: 2022-05-14 | End: 2022-05-14 | Stop reason: HOSPADM

## 2022-05-14 RX ORDER — OXYTOCIN/0.9 % SODIUM CHLORIDE 30/500 ML
85 PLASTIC BAG, INJECTION (ML) INTRAVENOUS ONCE
Status: COMPLETED | OUTPATIENT
Start: 2022-05-14 | End: 2022-05-14

## 2022-05-14 RX ORDER — LIDOCAINE HYDROCHLORIDE AND EPINEPHRINE 15; 5 MG/ML; UG/ML
INJECTION, SOLUTION EPIDURAL AS NEEDED
Status: DISCONTINUED | OUTPATIENT
Start: 2022-05-14 | End: 2022-05-14 | Stop reason: SURG

## 2022-05-14 RX ORDER — ONDANSETRON 4 MG/1
4 TABLET, FILM COATED ORAL EVERY 6 HOURS PRN
Status: DISCONTINUED | OUTPATIENT
Start: 2022-05-14 | End: 2022-05-14 | Stop reason: HOSPADM

## 2022-05-14 RX ORDER — HYDROCORTISONE 25 MG/G
1 CREAM TOPICAL AS NEEDED
Status: DISCONTINUED | OUTPATIENT
Start: 2022-05-14 | End: 2022-05-16 | Stop reason: HOSPADM

## 2022-05-14 RX ORDER — ONDANSETRON 4 MG/1
4 TABLET, FILM COATED ORAL EVERY 8 HOURS PRN
Status: DISCONTINUED | OUTPATIENT
Start: 2022-05-14 | End: 2022-05-16 | Stop reason: HOSPADM

## 2022-05-14 RX ORDER — TRISODIUM CITRATE DIHYDRATE AND CITRIC ACID MONOHYDRATE 500; 334 MG/5ML; MG/5ML
30 SOLUTION ORAL ONCE
Status: DISCONTINUED | OUTPATIENT
Start: 2022-05-14 | End: 2022-05-14 | Stop reason: HOSPADM

## 2022-05-14 RX ORDER — BISACODYL 10 MG
10 SUPPOSITORY, RECTAL RECTAL DAILY PRN
Status: DISCONTINUED | OUTPATIENT
Start: 2022-05-15 | End: 2022-05-16 | Stop reason: HOSPADM

## 2022-05-14 RX ORDER — FAMOTIDINE 10 MG/ML
20 INJECTION, SOLUTION INTRAVENOUS ONCE AS NEEDED
Status: COMPLETED | OUTPATIENT
Start: 2022-05-14 | End: 2022-05-14

## 2022-05-14 RX ORDER — ACETAMINOPHEN 325 MG/1
650 TABLET ORAL EVERY 4 HOURS PRN
Status: DISCONTINUED | OUTPATIENT
Start: 2022-05-14 | End: 2022-05-15

## 2022-05-14 RX ORDER — METOCLOPRAMIDE HYDROCHLORIDE 5 MG/ML
10 INJECTION INTRAMUSCULAR; INTRAVENOUS ONCE AS NEEDED
Status: DISCONTINUED | OUTPATIENT
Start: 2022-05-14 | End: 2022-05-14 | Stop reason: HOSPADM

## 2022-05-14 RX ORDER — MISOPROSTOL 200 UG/1
800 TABLET ORAL AS NEEDED
Status: DISCONTINUED | OUTPATIENT
Start: 2022-05-14 | End: 2022-05-14 | Stop reason: HOSPADM

## 2022-05-14 RX ORDER — OXYTOCIN/0.9 % SODIUM CHLORIDE 30/500 ML
2-20 PLASTIC BAG, INJECTION (ML) INTRAVENOUS
Status: DISCONTINUED | OUTPATIENT
Start: 2022-05-14 | End: 2022-05-14 | Stop reason: HOSPADM

## 2022-05-14 RX ORDER — IBUPROFEN 600 MG/1
600 TABLET ORAL EVERY 6 HOURS PRN
Status: DISCONTINUED | OUTPATIENT
Start: 2022-05-14 | End: 2022-05-16 | Stop reason: HOSPADM

## 2022-05-14 RX ORDER — SODIUM CHLORIDE, SODIUM LACTATE, POTASSIUM CHLORIDE, CALCIUM CHLORIDE 600; 310; 30; 20 MG/100ML; MG/100ML; MG/100ML; MG/100ML
125 INJECTION, SOLUTION INTRAVENOUS CONTINUOUS
Status: DISCONTINUED | OUTPATIENT
Start: 2022-05-14 | End: 2022-05-14

## 2022-05-14 RX ORDER — FENTANYL CITRATE 50 UG/ML
INJECTION, SOLUTION INTRAMUSCULAR; INTRAVENOUS AS NEEDED
Status: DISCONTINUED | OUTPATIENT
Start: 2022-05-14 | End: 2022-05-14 | Stop reason: SURG

## 2022-05-14 RX ORDER — DIPHENHYDRAMINE HYDROCHLORIDE 50 MG/ML
12.5 INJECTION INTRAMUSCULAR; INTRAVENOUS EVERY 8 HOURS PRN
Status: DISCONTINUED | OUTPATIENT
Start: 2022-05-14 | End: 2022-05-14 | Stop reason: HOSPADM

## 2022-05-14 RX ORDER — ONDANSETRON 2 MG/ML
4 INJECTION INTRAMUSCULAR; INTRAVENOUS EVERY 6 HOURS PRN
Status: DISCONTINUED | OUTPATIENT
Start: 2022-05-14 | End: 2022-05-14 | Stop reason: HOSPADM

## 2022-05-14 RX ORDER — OXYTOCIN/0.9 % SODIUM CHLORIDE 30/500 ML
650 PLASTIC BAG, INJECTION (ML) INTRAVENOUS ONCE
Status: COMPLETED | OUTPATIENT
Start: 2022-05-14 | End: 2022-05-14

## 2022-05-14 RX ORDER — EPHEDRINE SULFATE 5 MG/ML
INJECTION INTRAVENOUS
Status: DISCONTINUED
Start: 2022-05-14 | End: 2022-05-16 | Stop reason: HOSPADM

## 2022-05-14 RX ORDER — DOCUSATE SODIUM 100 MG/1
100 CAPSULE, LIQUID FILLED ORAL 2 TIMES DAILY
Status: DISCONTINUED | OUTPATIENT
Start: 2022-05-14 | End: 2022-05-16 | Stop reason: HOSPADM

## 2022-05-14 RX ORDER — SODIUM CHLORIDE 0.9 % (FLUSH) 0.9 %
1-10 SYRINGE (ML) INJECTION AS NEEDED
Status: DISCONTINUED | OUTPATIENT
Start: 2022-05-14 | End: 2022-05-16 | Stop reason: HOSPADM

## 2022-05-14 RX ADMIN — SODIUM CHLORIDE 5 MILLION UNITS: 900 INJECTION INTRAVENOUS at 02:12

## 2022-05-14 RX ADMIN — FENTANYL CITRATE 100 MCG: 50 INJECTION, SOLUTION INTRAMUSCULAR; INTRAVENOUS at 05:41

## 2022-05-14 RX ADMIN — Medication 1 APPLICATION: at 15:12

## 2022-05-14 RX ADMIN — SODIUM CHLORIDE 3 MILLION UNITS: 9 INJECTION, SOLUTION INTRAVENOUS at 05:59

## 2022-05-14 RX ADMIN — SODIUM CHLORIDE 3 MILLION UNITS: 9 INJECTION, SOLUTION INTRAVENOUS at 10:09

## 2022-05-14 RX ADMIN — IBUPROFEN 600 MG: 600 TABLET ORAL at 22:21

## 2022-05-14 RX ADMIN — LIDOCAINE HYDROCHLORIDE AND EPINEPHRINE 3 ML: 15; 5 INJECTION, SOLUTION EPIDURAL at 05:39

## 2022-05-14 RX ADMIN — ONDANSETRON 4 MG: 2 INJECTION INTRAMUSCULAR; INTRAVENOUS at 06:18

## 2022-05-14 RX ADMIN — SODIUM CHLORIDE, POTASSIUM CHLORIDE, SODIUM LACTATE AND CALCIUM CHLORIDE 125 ML/HR: 600; 310; 30; 20 INJECTION, SOLUTION INTRAVENOUS at 05:58

## 2022-05-14 RX ADMIN — DOCUSATE SODIUM 100 MG: 100 CAPSULE, LIQUID FILLED ORAL at 22:21

## 2022-05-14 RX ADMIN — FAMOTIDINE 20 MG: 10 INJECTION INTRAVENOUS at 06:11

## 2022-05-14 RX ADMIN — Medication 2 MILLI-UNITS/MIN: at 04:47

## 2022-05-14 RX ADMIN — WITCH HAZEL 1 PAD: 500 SOLUTION RECTAL; TOPICAL at 15:12

## 2022-05-14 RX ADMIN — Medication 10 ML: at 06:11

## 2022-05-14 RX ADMIN — LIDOCAINE HYDROCHLORIDE AND EPINEPHRINE 2 ML: 15; 5 INJECTION, SOLUTION EPIDURAL at 05:40

## 2022-05-14 RX ADMIN — Medication 650 ML/HR: at 12:15

## 2022-05-14 RX ADMIN — Medication: at 15:12

## 2022-05-14 RX ADMIN — IBUPROFEN 600 MG: 600 TABLET ORAL at 16:52

## 2022-05-14 RX ADMIN — MINERAL OIL 30 ML: 1000 SOLUTION ORAL at 12:00

## 2022-05-14 RX ADMIN — HYDROCORTISONE 2.5% 1 APPLICATION: 25 CREAM TOPICAL at 15:12

## 2022-05-14 RX ADMIN — SODIUM CHLORIDE, POTASSIUM CHLORIDE, SODIUM LACTATE AND CALCIUM CHLORIDE 1000 ML: 600; 310; 30; 20 INJECTION, SOLUTION INTRAVENOUS at 05:27

## 2022-05-14 RX ADMIN — BUPIVACAINE HYDROCHLORIDE 12 ML: 2.5 INJECTION, SOLUTION EPIDURAL; INFILTRATION; INTRACAUDAL; PERINEURAL at 05:41

## 2022-05-14 RX ADMIN — SODIUM CHLORIDE, POTASSIUM CHLORIDE, SODIUM LACTATE AND CALCIUM CHLORIDE 125 ML/HR: 600; 310; 30; 20 INJECTION, SOLUTION INTRAVENOUS at 01:00

## 2022-05-14 RX ADMIN — HUMAN RHO(D) IMMUNE GLOBULIN 1500 UNITS: 1500 SOLUTION INTRAMUSCULAR; INTRAVENOUS at 22:21

## 2022-05-14 RX ADMIN — Medication 85 ML/HR: at 13:26

## 2022-05-14 NOTE — L&D DELIVERY NOTE
[place]  Vaginal Delivery Note   Review the Delivery Report for details.       Delivery     Delivery: Vaginal, Spontaneous     YOB: 2022    Time of Birth:  Gestational Age 12:10 PM   38w6d     Anesthesia: Epidural     Delivering clinician: Miya Muñoz    Forceps?   No   Vacuum? No    Shoulder dystocia present: No        Delivery narrative:  Viable male infant delivered OA over intact perineum. Nuchal cord x1 easily reduced. Anterior shoulder delivered easily followed by posterior shoulder and remainder of infant body. Infant placed on maternal abdomen, bulb suctioned and dried. Umbilical cord clamped x2 and cut after 60 second delay. Placenta then delivered spontaneously with gentle traction, intact with 3VC. Second degree laceration repaired with 3-0 vicryl and 2-0 vicryl in two layer fashion. Fundus then firm. Counts correct. EBL 550ml.        Infant    Findings: male  infant     Infant observations: Weight: 3390 g (7 lb 7.6 oz)   Length: 18.5  in  Observations/Comments:        Apgars: 8  @ 1 minute /    9  @ 5 minutes         Placenta, Cord, and Fluid    Placenta delivered  Spontaneous  at   5/14/2022 12:14 PM     Cord: 3 vessels  present.   Nuchal Cord?  yes; Number of nuchal loops present:  1    Cord blood obtained: Yes    Cord gases obtained:  No    Cord gas results: Venous:  No results found for: PHCVEN    Arterial:  No results found for: PHCART     Repair    Episiotomy: None     No    Lacerations: Yes  Laceration Information  Laceration Repaired?   Perineal: 2nd  Yes    Periurethral:       Labial:       Sulcus:       Vaginal:       Cervical:         Suture used for repair: 2-0 Vicryl   Estimated Blood Loss:  550ml             Quantitative Blood Loss:    QBL from VAG DEL: 544 (05/14/22 1233)             Complications  none    Disposition  Mother to Mother Baby/Postpartum  in stable condition currently.  Baby to remains with mom  in stable condition currently.      Miya Muñoz,  MD  05/14/22  13:35 EDT

## 2022-05-14 NOTE — PROCEDURES
Transcervical trotter placed per physician request.  FHT's class 1.  Patient tolerated well. 24 Salvadorean, 60ml.    Jagjit Damon MD  5/14/2022  01:57 EDT

## 2022-05-14 NOTE — ANESTHESIA PROCEDURE NOTES
Labor Epidural      Patient reassessed immediately prior to procedure    Patient location during procedure: OB  Performed By  Anesthesiologist: Carroll Lafleur DO  Preanesthetic Checklist  Completed: patient identified, IV checked, site marked, risks and benefits discussed, surgical consent, monitors and equipment checked, pre-op evaluation and timeout performed  Prep:  Pt Position:sitting  Sterile Tech:gloves, mask, sterile barrier and cap  Prep:chlorhexidine gluconate and isopropyl alcohol  Monitoring:blood pressure monitoring and continuous pulse oximetry  Epidural Block Procedure:  Approach:midline  Guidance:landmark technique and palpation technique  Location:L3-L4  Needle Type:Tuohy  Needle Gauge:17 G  Loss of Resistance Medium: air  Loss of Resistance: 4cm  Cath Depth at skin:9 cm  Paresthesia: none  Aspiration:negative  Test Dose:negative  Number of Attempts: 1  Post Assessment:  Dressing:secured with tape and occlusive dressing applied (Tegaderm Placed)  Pt Tolerance:patient tolerated the procedure well with no apparent complications  Complications:no

## 2022-05-14 NOTE — LACTATION NOTE
05/14/22 1800   Maternal Information   Date of Referral 05/14/22   Person Making Referral nurse   Maternal Reason for Referral no prior breastfeeding experience  (courtesy visit for new delivery)   Maternal Assessment   Breast Size Issue none   Breast Shape Bilateral:;round   Breast Density Bilateral:;soft   Nipples Bilateral:;graspable   Left Nipple Symptoms intact;nontender   Right Nipple Symptoms intact;nontender   Maternal Infant Feeding   Maternal Emotional State receptive;relaxed   Infant Positioning cross-cradle;clutch/football  (attempted cc but infant fussy. repositioned to football and infant did well at breast)   Signs of Milk Transfer deep jaw excursions noted;audible swallow   Pain with Feeding no   Comfort Measures Before/During Feeding infant position adjusted;latch adjusted;maternal position adjusted   Latch Assistance full assistance needed  (mother took over after instructed.)   Support Person Involvement actively supporting mother  (very helpful and attentive)   Milk Expression/Equipment   Breast Pump Type double electric, personal  ([employer] at home)

## 2022-05-14 NOTE — H&P
"Elma Lmia  1990  [id number]  [id number]    CC: leaking fluid  HPI:  Patient is 31 y.o. female   currently at 38w6d presents with c/o poss SROM.  Pt with sm-mod amt on pad.  Pt denies vag bleeding.  PT is having uterine contractions, rates 3-4/10, non-radiating.  Good FM.  BPNC to date.    PMH:  Current meds: PNV, valtrex, tums  Illnesses: tuberculosis (college)  Surgeries: jaw surg, oral surg  Allergies: ceclor- rash    Past OB History:       OB History    Para Term  AB Living   1 0 0 0 0 0   SAB IAB Ectopic Molar Multiple Live Births   0 0 0 0 0 0      # Outcome Date GA Lbr Emeterio/2nd Weight Sex Delivery Anes PTL Lv   1 Current                     SH: tob neg , EtOH neg, drugs neg  FH: heart dz pos , diabetes neg , cancer pos    General ROS: leaking, contractions, edema.   All other systems reviewed and are negative.      Physical Examination: General appearance - alert, well appearing, and in no distress  Vital signs - /94   Pulse 86   Temp 98.2 °F (36.8 °C) (Oral)   Resp 18   Ht 162.6 cm (64\")   Wt 79.8 kg (176 lb)   LMP 08/15/2021 (Exact Date)   SpO2 99%   Breastfeeding Yes   BMI 30.21 kg/m²   HEENT: normocephalic, atraumatic,oropharynx clear, appearance of ears and nose normal  Neck - supple, no significant adenopathy, no thyromegaly  Lymphatics - no palpable lymphadenopathy in the neck or groin, no hepatosplenomegaly  Chest - clear to auscultation, no wheezes, rales or rhonchi, respiratory effort non-labored  Heart - normal rate, regular rhythm, no murmurs, rubs, clicks or gallops, no JVD, trace lower extremity edema  Abdomen - soft, nontender, nondistended, no masses, no hepatosplenomegaly  no rebound tenderness noted, bowel sounds normal  Vaginal Exam: 2/80%/-1, amnisure neg, no blood in vault ,external genitalia normal  Extremities - no pedal edema noted, no calf tend  Skin -warm and dry, normal coloration and turgor, no rashes, no suspicious skin lesions " noted      Labs:  Results from last 7 days   Lab Units 05/13/22  2314   WBC 10*3/mm3 11.72*   HEMOGLOBIN g/dL 10.8*   HEMATOCRIT % 31.1*   PLATELETS 10*3/mm3 213     Results from last 7 days   Lab Units 05/13/22  2314   CREATININE mg/dL 0.62   BILIRUBIN mg/dL 0.2   ALK PHOS U/L 137*   ALT (SGPT) U/L 7   AST (SGOT) U/L 16     Fetal monitoring: indication contractions, onset 2252 , offset 2359 , baseline 125 , mod BTB variability , multiple accels (15 X 15), no decels, irreg contractions, interpretation reactive NST    Radiology     Assessment 1)IUP 38 6/7 weeks   2)early vs false labor   3)gest HTN    Plan 1)admit   2)augment/induct   3)discussed with Dr. Toni Damon MD  5/14/2022  01:43 EDT

## 2022-05-14 NOTE — ANESTHESIA PREPROCEDURE EVALUATION
Anesthesia Evaluation     Patient summary reviewed and Nursing notes reviewed   NPO Solid Status: > 6 hours  NPO Liquid Status: < 2 hours           Airway   Mallampati: II  TM distance: >3 FB  Neck ROM: full  No difficulty expected  Dental      Pulmonary - negative pulmonary ROS   Cardiovascular - negative cardio ROS        Neuro/Psych  (+) headaches,    GI/Hepatic/Renal/Endo - negative ROS     Musculoskeletal (-) negative ROS    Abdominal    Substance History - negative use     OB/GYN    (+) Pregnant, pregnancy induced hypertension        Other                        Anesthesia Plan    ASA 2     epidural       Anesthetic plan, all risks, benefits, and alternatives have been provided, discussed and informed consent has been obtained with: patient.  Use of blood products discussed with patient .       CODE STATUS:    Level Of Support Discussed With: Patient  Code Status (Patient has no pulse and is not breathing): CPR (Attempt to Resuscitate)  Medical Interventions (Patient has pulse or is breathing): Full Support

## 2022-05-14 NOTE — LACTATION NOTE
05/14/22 1615   Maternal Information   Date of Referral 05/14/22   Person Making Referral nurse   Maternal Reason for Referral no prior breastfeeding experience  (courtesy visit for new delivery)   Maternal Assessment   Breast Size Issue none   Breast Shape Bilateral:;round   Breast Density Bilateral:;soft   Nipples Bilateral:;graspable   Left Nipple Symptoms intact;nontender   Right Nipple Symptoms intact;nontender   Maternal Infant Feeding   Maternal Emotional State receptive;relaxed   Infant Positioning clutch/football;cross-cradle  (attempted cc but infant too fussy. repostioned to football and infant did well.)   Signs of Milk Transfer deep jaw excursions noted;audible swallow   Pain with Feeding no   Comfort Measures Before/During Feeding infant position adjusted;latch adjusted;maternal position adjusted   Latch Assistance full assistance needed  (mother took over after instructions given)   Support Person Involvement actively supporting mother  (very helpful supportive)   Milk Expression/Equipment   Breast Pump Type double electric, personal  ([employer] at home)

## 2022-05-14 NOTE — PROGRESS NOTES
HERMES Barahona  Elma Lima  : 1990  MRN: [id number]  CSN: [id number]    Labor progress note    Subjective   She is comfortable with the epidural.     Objective   Min/max vitals past 24 hours:  Temp  Min: 97.7 °F (36.5 °C)  Max: 98.2 °F (36.8 °C)   BP  Min: 117/66  Max: 149/91   Pulse  Min: 72  Max: 142   Resp  Min: 16  Max: 18        FHT's: reassuring and category 1   Cervix: was checked (by me): 6 cm / 70 % / -1   Contractions: regular every 2-3 minutes        Assessment   1. IUP at 38w6d  2. Progressing in labor  3. Fetal status reassuring  4. Gestational hypertension      Plan   1. Continue with induction  2. AROM - clear fluid seen   3. Recheck cervix in 2-3 hours    Miya Muñoz MD  2022  06:57 EDT

## 2022-05-15 ENCOUNTER — APPOINTMENT (OUTPATIENT)
Dept: GENERAL RADIOLOGY | Facility: HOSPITAL | Age: 32
End: 2022-05-15

## 2022-05-15 LAB
BASOPHILS # BLD AUTO: 0.07 10*3/MM3 (ref 0–0.2)
BASOPHILS NFR BLD AUTO: 0.4 % (ref 0–1.5)
DEPRECATED RDW RBC AUTO: 45.5 FL (ref 37–54)
EOSINOPHIL # BLD AUTO: 0.09 10*3/MM3 (ref 0–0.4)
EOSINOPHIL NFR BLD AUTO: 0.6 % (ref 0.3–6.2)
ERYTHROCYTE [DISTWIDTH] IN BLOOD BY AUTOMATED COUNT: 14 % (ref 12.3–15.4)
HCT VFR BLD AUTO: 28 % (ref 34–46.6)
HGB BLD-MCNC: 9.6 G/DL (ref 12–15.9)
IMM GRANULOCYTES # BLD AUTO: 0.1 10*3/MM3 (ref 0–0.05)
IMM GRANULOCYTES NFR BLD AUTO: 0.6 % (ref 0–0.5)
LYMPHOCYTES # BLD AUTO: 2.7 10*3/MM3 (ref 0.7–3.1)
LYMPHOCYTES NFR BLD AUTO: 16.7 % (ref 19.6–45.3)
MCH RBC QN AUTO: 31 PG (ref 26.6–33)
MCHC RBC AUTO-ENTMCNC: 34.3 G/DL (ref 31.5–35.7)
MCV RBC AUTO: 90.3 FL (ref 79–97)
MONOCYTES # BLD AUTO: 1.05 10*3/MM3 (ref 0.1–0.9)
MONOCYTES NFR BLD AUTO: 6.5 % (ref 5–12)
NEUTROPHILS NFR BLD AUTO: 12.15 10*3/MM3 (ref 1.7–7)
NEUTROPHILS NFR BLD AUTO: 75.2 % (ref 42.7–76)
NRBC BLD AUTO-RTO: 0 /100 WBC (ref 0–0.2)
PLATELET # BLD AUTO: 204 10*3/MM3 (ref 140–450)
PMV BLD AUTO: 11.1 FL (ref 6–12)
RBC # BLD AUTO: 3.1 10*6/MM3 (ref 3.77–5.28)
WBC NRBC COR # BLD: 16.16 10*3/MM3 (ref 3.4–10.8)

## 2022-05-15 PROCEDURE — 0503F POSTPARTUM CARE VISIT: CPT | Performed by: NURSE PRACTITIONER

## 2022-05-15 PROCEDURE — 72170 X-RAY EXAM OF PELVIS: CPT

## 2022-05-15 PROCEDURE — 85025 COMPLETE CBC W/AUTO DIFF WBC: CPT | Performed by: OBSTETRICS & GYNECOLOGY

## 2022-05-15 RX ORDER — HYDROCODONE BITARTRATE AND ACETAMINOPHEN 5; 325 MG/1; MG/1
1 TABLET ORAL EVERY 4 HOURS PRN
Status: DISCONTINUED | OUTPATIENT
Start: 2022-05-15 | End: 2022-05-16 | Stop reason: HOSPADM

## 2022-05-15 RX ORDER — VALACYCLOVIR HYDROCHLORIDE 500 MG/1
1000 TABLET, FILM COATED ORAL
Status: COMPLETED | OUTPATIENT
Start: 2022-05-15 | End: 2022-05-16

## 2022-05-15 RX ORDER — FAMOTIDINE 20 MG/1
20 TABLET, FILM COATED ORAL 2 TIMES DAILY PRN
Status: DISCONTINUED | OUTPATIENT
Start: 2022-05-15 | End: 2022-05-16 | Stop reason: HOSPADM

## 2022-05-15 RX ORDER — OXYCODONE HYDROCHLORIDE AND ACETAMINOPHEN 5; 325 MG/1; MG/1
1 TABLET ORAL EVERY 4 HOURS PRN
Status: DISCONTINUED | OUTPATIENT
Start: 2022-05-15 | End: 2022-05-16 | Stop reason: HOSPADM

## 2022-05-15 RX ADMIN — HYDROCODONE BITARTRATE AND ACETAMINOPHEN 1 TABLET: 5; 325 TABLET ORAL at 10:04

## 2022-05-15 RX ADMIN — HYDROCODONE BITARTRATE AND ACETAMINOPHEN 1 TABLET: 5; 325 TABLET ORAL at 06:16

## 2022-05-15 RX ADMIN — IBUPROFEN 600 MG: 600 TABLET ORAL at 23:19

## 2022-05-15 RX ADMIN — DOCUSATE SODIUM 100 MG: 100 CAPSULE, LIQUID FILLED ORAL at 08:27

## 2022-05-15 RX ADMIN — PRENATAL VITAMINS-IRON FUMARATE 27 MG IRON-FOLIC ACID 0.8 MG TABLET 1 TABLET: at 08:27

## 2022-05-15 RX ADMIN — FAMOTIDINE 20 MG: 20 TABLET ORAL at 00:36

## 2022-05-15 RX ADMIN — IBUPROFEN 600 MG: 600 TABLET ORAL at 18:17

## 2022-05-15 RX ADMIN — DOCUSATE SODIUM 100 MG: 100 CAPSULE, LIQUID FILLED ORAL at 21:11

## 2022-05-15 RX ADMIN — OXYCODONE HYDROCHLORIDE AND ACETAMINOPHEN 1 TABLET: 5; 325 TABLET ORAL at 23:20

## 2022-05-15 RX ADMIN — OXYCODONE HYDROCHLORIDE AND ACETAMINOPHEN 1 TABLET: 5; 325 TABLET ORAL at 13:15

## 2022-05-15 RX ADMIN — OXYCODONE HYDROCHLORIDE AND ACETAMINOPHEN 1 TABLET: 5; 325 TABLET ORAL at 17:17

## 2022-05-15 RX ADMIN — VALACYCLOVIR HYDROCHLORIDE 1000 MG: 500 TABLET, FILM COATED ORAL at 11:23

## 2022-05-15 RX ADMIN — IBUPROFEN 600 MG: 600 TABLET ORAL at 06:09

## 2022-05-15 RX ADMIN — IBUPROFEN 600 MG: 600 TABLET ORAL at 12:21

## 2022-05-15 NOTE — PROGRESS NOTES
5/15/2022  PPD #1    Subjective   Elma feels tired and sore.  Patient describes her lochia less than menses.  Pain is suboptimally controlled       Objective   Temp: Temp:  [97.7 °F (36.5 °C)-99 °F (37.2 °C)] 97.7 °F (36.5 °C) Temp src: Oral   BP: BP: (108-141)/(55-89) 116/74        Pulse: Heart Rate:  [] 67  RR: Resp:  [16-18] 18    General:  No acute distress   Abdomen: Fundus firm and beneath umbilicus   Pelvis: deferred     Lab Results   Component Value Date    WBC 11.72 (H) 05/13/2022    HGB 10.8 (L) 05/13/2022    HCT 31.1 (L) 05/13/2022    MCV 88.4 05/13/2022     05/13/2022    HEPBSAG Negative 10/01/2021     Infant male, doing well. Desires circumcision    Assessment  1. PPD# 1 after vaginal delivery- significant tailbone pain, unable to sit comfortably. Ambulating without difficulty.  2. RH negative, rhogam was given.   3. PIH- isolated mild range BP- asymptomatic. PEP on admit normal    Plan  1. Routine postpartum care.  2. XRAY and PT ordered. Add percocet prn. Donut ring given.  3. PEP in am. Continue monitoring BP      This note has been electronically signed.    Zulay Gonzalez, APRN  May 15, 2022

## 2022-05-15 NOTE — LACTATION NOTE
Revisited with mother to see how breastfeeding is going.  Mother reports that breastfeeding is going well.  Discussed what to do with a missed feeding due to infant curcumsion.  Encouraged mother to attempt a feed every 3 hours and to do skin to skin and to pump for any missed feedings.  Gave a Hand pump to use at the hospital.  Has a Pump at home.  Encouraged mother to call lactation services if needing any further assistance.

## 2022-05-15 NOTE — ANESTHESIA POSTPROCEDURE EVALUATION
Patient: Elma Lima    Procedure Summary     Date: 05/14/22 Room / Location:     Anesthesia Start: 0525 Anesthesia Stop: 1210    Procedure: LABOR ANALGESIA Diagnosis:     Scheduled Providers:  Provider: Carroll aLfleur DO    Anesthesia Type: epidural ASA Status: 2          Anesthesia Type: epidural    Vitals  Vitals Value Taken Time   /74 05/15/22 0900   Temp 97.7 °F (36.5 °C) 05/15/22 0830   Pulse 67 05/15/22 0900   Resp 18 05/15/22 0830   SpO2 100 % 05/14/22 0538           Post Anesthesia Care and Evaluation    Patient location during evaluation: bedside  Patient participation: complete - patient participated  Level of consciousness: awake and awake and alert  Pain score: 0  Pain management: satisfactory to patient  Airway patency: patent  Anesthetic complications: No anesthetic complications  PONV Status: none  Cardiovascular status: acceptable, hemodynamically stable and stable  Respiratory status: acceptable  Hydration status: stable  Post Neuraxial Block status: Motor and sensory function returned to baseline and No signs or symptoms of PDPH

## 2022-05-16 VITALS
BODY MASS INDEX: 30.05 KG/M2 | OXYGEN SATURATION: 100 % | SYSTOLIC BLOOD PRESSURE: 105 MMHG | TEMPERATURE: 98.4 F | RESPIRATION RATE: 16 BRPM | DIASTOLIC BLOOD PRESSURE: 55 MMHG | HEART RATE: 77 BPM | WEIGHT: 176 LBS | HEIGHT: 64 IN

## 2022-05-16 LAB
ALP SERPL-CCNC: 104 U/L (ref 39–117)
ALT SERPL W P-5'-P-CCNC: 10 U/L (ref 1–33)
AST SERPL-CCNC: 25 U/L (ref 1–32)
BILIRUB SERPL-MCNC: <0.2 MG/DL (ref 0–1.2)
CREAT SERPL-MCNC: 0.56 MG/DL (ref 0.57–1)
DEPRECATED RDW RBC AUTO: 45.1 FL (ref 37–54)
ERYTHROCYTE [DISTWIDTH] IN BLOOD BY AUTOMATED COUNT: 13.8 % (ref 12.3–15.4)
HCT VFR BLD AUTO: 27.1 % (ref 34–46.6)
HGB BLD-MCNC: 9.2 G/DL (ref 12–15.9)
LDH SERPL-CCNC: 234 U/L (ref 135–214)
MCH RBC QN AUTO: 30.6 PG (ref 26.6–33)
MCHC RBC AUTO-ENTMCNC: 33.9 G/DL (ref 31.5–35.7)
MCV RBC AUTO: 90 FL (ref 79–97)
PLATELET # BLD AUTO: 204 10*3/MM3 (ref 140–450)
PMV BLD AUTO: 10.9 FL (ref 6–12)
RBC # BLD AUTO: 3.01 10*6/MM3 (ref 3.77–5.28)
URATE SERPL-MCNC: 3 MG/DL (ref 2.4–5.7)
WBC NRBC COR # BLD: 10.02 10*3/MM3 (ref 3.4–10.8)

## 2022-05-16 PROCEDURE — 84550 ASSAY OF BLOOD/URIC ACID: CPT | Performed by: NURSE PRACTITIONER

## 2022-05-16 PROCEDURE — 84460 ALANINE AMINO (ALT) (SGPT): CPT | Performed by: NURSE PRACTITIONER

## 2022-05-16 PROCEDURE — 85027 COMPLETE CBC AUTOMATED: CPT | Performed by: NURSE PRACTITIONER

## 2022-05-16 PROCEDURE — 97161 PT EVAL LOW COMPLEX 20 MIN: CPT

## 2022-05-16 PROCEDURE — 84075 ASSAY ALKALINE PHOSPHATASE: CPT | Performed by: NURSE PRACTITIONER

## 2022-05-16 PROCEDURE — 82565 ASSAY OF CREATININE: CPT | Performed by: NURSE PRACTITIONER

## 2022-05-16 PROCEDURE — 82247 BILIRUBIN TOTAL: CPT | Performed by: NURSE PRACTITIONER

## 2022-05-16 PROCEDURE — 83615 LACTATE (LD) (LDH) ENZYME: CPT | Performed by: NURSE PRACTITIONER

## 2022-05-16 PROCEDURE — 0503F POSTPARTUM CARE VISIT: CPT | Performed by: NURSE PRACTITIONER

## 2022-05-16 PROCEDURE — 84450 TRANSFERASE (AST) (SGOT): CPT | Performed by: NURSE PRACTITIONER

## 2022-05-16 RX ORDER — OXYCODONE HYDROCHLORIDE AND ACETAMINOPHEN 5; 325 MG/1; MG/1
1 TABLET ORAL EVERY 4 HOURS PRN
Qty: 18 TABLET | Refills: 0 | Status: SHIPPED | OUTPATIENT
Start: 2022-05-16 | End: 2022-05-19

## 2022-05-16 RX ADMIN — IBUPROFEN 600 MG: 600 TABLET ORAL at 12:21

## 2022-05-16 RX ADMIN — VALACYCLOVIR HYDROCHLORIDE 1000 MG: 500 TABLET, FILM COATED ORAL at 07:13

## 2022-05-16 RX ADMIN — IBUPROFEN 600 MG: 600 TABLET ORAL at 05:58

## 2022-05-16 RX ADMIN — OXYCODONE HYDROCHLORIDE AND ACETAMINOPHEN 1 TABLET: 5; 325 TABLET ORAL at 03:20

## 2022-05-16 RX ADMIN — OXYCODONE HYDROCHLORIDE AND ACETAMINOPHEN 1 TABLET: 5; 325 TABLET ORAL at 07:13

## 2022-05-16 RX ADMIN — OXYCODONE HYDROCHLORIDE AND ACETAMINOPHEN 1 TABLET: 5; 325 TABLET ORAL at 12:21

## 2022-05-16 RX ADMIN — DOCUSATE SODIUM 100 MG: 100 CAPSULE, LIQUID FILLED ORAL at 07:13

## 2022-05-16 NOTE — PLAN OF CARE
Goal Outcome Evaluation:  Plan of Care Reviewed With: patient        Progress: no change  Outcome Evaluation: PT eval complete. Pt reported pain in tailbone with sittind and STS activity. Pt educated on modalities, light stretching, and body mechanics for pain management. If symptoms do not improve or worsen, pt encouraged to consult PCP. Pt verbalized understanding of all education. PT to d/c services at this time.

## 2022-05-16 NOTE — THERAPY DISCHARGE NOTE
Patient Name: Elma Lima  : 1990    MRN: [id number]                              Today's Date: 2022       Admit Date: 2022    Visit Dx: No diagnosis found.  Patient Active Problem List   Diagnosis   • Herpes simplex type 2 (HSV-2) infection affecting pregnancy, antepartum   • Pregnancy   • Gestational hypertension without significant proteinuria during pregnancy in third trimester, antepartum   • Postpartum care following vaginal delivery 22     Past Medical History:   Diagnosis Date   • HSV-1 (herpes simplex virus 1) infection    • Migraine    • Tuberculosis Dec 2013 positive for latent    Treatment completed at [facility] Infectious disease   • Varicella Small child     Past Surgical History:   Procedure Laterality Date   • MANDIBLE FRACTURE SURGERY     • WISDOM TOOTH EXTRACTION        General Information     Row Name 22 1015          Physical Therapy Time and Intention    Document Type discharge evaluation/summary  -     Mode of Treatment physical therapy  -     Row Name 22 1015          General Information    Patient Profile Reviewed yes  -HP     Prior Level of Function independent:;all household mobility;community mobility;gait;transfer;bed mobility;ADL's  -     Existing Precautions/Restrictions no known precautions/restrictions  -     Barriers to Rehab none identified  -     Row Name 22 1015          Living Environment    People in Home significant other  -HP     Row Name 22 1015          Cognition    Orientation Status (Cognition) oriented x 4  -           User Key  (r) = Recorded By, (t) = Taken By, (c) = Cosigned By    Initials Name Provider Type     Taylor Peña PT Physical Therapist               Mobility     Row Name 22 1015          Transfers    Comment, (Transfers) pt amb in room Ind  -           User Key  (r) = Recorded By, (t) = Taken By, (c) = Cosigned By    Initials Name Provider Type    Taylor Ontiveros PT  Physical Therapist               Obj/Interventions     [facility] Name 05/16/22 1015          Range of Motion Comprehensive    General Range of Motion no range of motion deficits identified  -HP     Row Name 05/16/22 1015          Strength Comprehensive (MMT)    General Manual Muscle Testing (MMT) Assessment no strength deficits identified  -HP     Row Name 05/16/22 1015          Balance    Comment, Balance No balance deficits  -           User Key  (r) = Recorded By, (t) = Taken By, (c) = Cosigned By    Initials Name Provider Type     Taylor Peña, PT Physical Therapist               Goals/Plan    No documentation.                Clinical Impression     Row Name 05/16/22 1016          Pain    Pretreatment Pain Rating 2/10  -     Posttreatment Pain Rating 2/10  -     Pain Location - buttock  -     Pre/Posttreatment Pain Comment pt at 8/10 with STS  -     Pain Intervention(s) Repositioned;Ambulation/increased activity  -HP     Row Name 05/16/22 1016          Plan of Care Review    Plan of Care Reviewed With patient  -     Progress no change  -     Outcome Evaluation PT eval complete. Pt reported pain in tailbone with sittind and STS activity. Pt educated on modalities, light stretching, and body mechanics for pain management. If symptoms do not improve or worsen, pt encouraged to consult PCP. Pt verbalized understanding of all education. PT to d/c services at this time.  -[facility] Name 05/16/22 1016          Therapy Assessment/Plan (PT)    Criteria for Skilled Interventions Met (PT) no;no problems identified which require skilled intervention  -HP     Row Name 05/16/22 1016          Vital Signs    Pre Systolic BP Rehab --  VSS; RN cleared for therapy  -     Pre Patient Position Standing  -     Intra Patient Position Standing  -HP     Post Patient Position Standing  -HP     Row Name 05/16/22 1016          Positioning and Restraints    Pre-Treatment Position standing in room  -HP     Post Treatment  Position --  pt amb in room Ind  -           User Key  (r) = Recorded By, (t) = Taken By, (c) = Cosigned By    Initials Name Provider Type     Taylor Peña PT Physical Therapist               Outcome Measures     Row Name 05/16/22 1019          How much help from another person do you currently need...    Turning from your back to your side while in flat bed without using bedrails? 4  -HP     Moving from lying on back to sitting on the side of a flat bed without bedrails? 4  -HP     Moving to and from a bed to a chair (including a wheelchair)? 4  -HP     Standing up from a chair using your arms (e.g., wheelchair, bedside chair)? 4  -HP     Climbing 3-5 steps with a railing? 4  -HP     To walk in hospital room? 4  -     AM-PAC 6 Clicks Score (PT) 24  -     Highest level of mobility 8 --> Walked 250 feet or more  -     Row Name 05/16/22 1019          Functional Assessment    Outcome Measure Options AM-PAC 6 Clicks Basic Mobility (PT)  -           User Key  (r) = Recorded By, (t) = Taken By, (c) = Cosigned By    Initials Name Provider Type     Taylor Peña PT Physical Therapist              Physical Therapy Education                 Title: PT OT SLP Therapies (Done)     Topic: Physical Therapy (Done)     Point: Mobility training (Done)     Learning Progress Summary           Patient Acceptance, E,D, VU by  at 5/16/2022 1019                   Point: Body mechanics (Done)     Learning Progress Summary           Patient Acceptance, E,D, VU by  at 5/16/2022 1019                               User Key     Initials Effective Dates Name Provider Type [facility] 06/01/21 -  Taylor Peña, KUSUM Physical Therapist PT              PT Recommendation and Plan     Plan of Care Reviewed With: patient  Progress: no change  Outcome Evaluation: PT eval complete. Pt reported pain in tailbone with sittind and STS activity. Pt educated on modalities, light stretching, and body mechanics for pain management. If  symptoms do not improve or worsen, pt encouraged to consult PCP. Pt verbalized understanding of all education. PT to d/c services at this time.     Time Calculation:    PT Charges     Row Name 05/16/22 0911             Time Calculation    Start Time 0911  -HP      PT Received On 05/16/22  -HP      PT Goal Re-Cert Due Date 05/26/22  -HP              Untimed Charges    PT Eval/Re-eval Minutes 37  -HP              Total Minutes    Untimed Charges Total Minutes 37  -HP       Total Minutes 37  -HP            User Key  (r) = Recorded By, (t) = Taken By, (c) = Cosigned By    Initials Name Provider Type    HP Taylor Peña, PT Physical Therapist              Therapy Charges for Today     Code Description Service Date Service Provider Modifiers Qty    [id number] HC PT EVAL LOW COMPLEXITY 3 5/16/2022 Taylor Peña, PT GP 1          PT G-Codes  Outcome Measure Options: AM-PAC 6 Clicks Basic Mobility (PT)  AM-PAC 6 Clicks Score (PT): 24    PT Discharge Summary  Anticipated Discharge Disposition (PT): home    Taylor Peña PT  5/16/2022

## 2022-05-16 NOTE — DISCHARGE SUMMARY
Discharge Summary    Date of Admission: 2022  Date of Discharge:  2022      Patient: Elma Lima      MR#:[id number]    Delivery Provider: Miya Muñoz     Discharge Surgeon/OB:     Presenting Problem/History of Present Illness  Gestational hypertension without significant proteinuria during pregnancy in third trimester, antepartum [O13.3]     Patient Active Problem List   Diagnosis   • Herpes simplex type 2 (HSV-2) infection affecting pregnancy, antepartum   • Pregnancy   • Gestational hypertension without significant proteinuria during pregnancy in third trimester, antepartum   • Postpartum care following vaginal delivery 22         Discharge Diagnosis: Vaginal delivery at 38w6d    Procedures:  Vaginal, Spontaneous     2022    12:10 PM        Discharge Date: 2022;     Hospital Course  Patient is a 31 y.o. female  at 38w6d status post vaginal delivery without complication. Postpartum the patient did well. She remained afebrile, with vital signs stable. She was ready for discharge on postpartum day 2.     She did experience some tail bone pain following delivery. Had PT evaluation    Infant:   male  fetus 3390 g (7 lb 7.6 oz)  with Apgar scores of 8 , 9  at five minutes.    Condition on Discharge:  Stable    Vital Signs  Temp:  [97.6 °F (36.4 °C)-98.4 °F (36.9 °C)] 98.4 °F (36.9 °C)  Heart Rate:  [73-95] 77  Resp:  [16-18] 16  BP: (105-132)/(55-79) 105/55    Lab Results   Component Value Date    WBC 10.02 2022    HGB 9.2 (L) 2022    HCT 27.1 (L) 2022    MCV 90.0 2022     2022       Discharge Disposition  Home or Self Care    Discharge Medications     Discharge Medications      New Medications      Instructions Start Date   oxyCODONE-acetaminophen 5-325 MG per tablet  Commonly known as: PERCOCET   1 tablet, Oral, Every 4 Hours PRN         Continue These Medications      Instructions Start Date   prenatal (CLASSIC) vitamin  tablet  Generic  drug: prenatal vitamin   Oral, Daily         Stop These Medications    TUMS PO     valACYclovir 1000 MG tablet  Commonly known as: VALTREX     valACYclovir 500 MG tablet  Commonly known as: Valtrex            Discharge Diet:     Activity at Discharge:   Activity Instructions     Bathing Restrictions      Type of Restriction: Bathing    Bathing Restrictions: Other    Explain Bathing Restrictions: May shower    Driving Restrictions      Type of Restriction: Driving    Driving Restrictions: No Driving Restrictions    Sexual Activity Restrictions      Type of Restriction: Sex    Explain Sexual Activity Restrictions: Avoid intercourse until after 6 week post partum visit    Work Restrictions      Type of Restriction: Work    May Return to Work: Other    Return to Work Instructions: May return to work in 6 weeks post partum      Rx percocet for tailbone pain following delivery.    Follow-up Appointments  Future Appointments   Date Time Provider Department Center   5/20/2022  3:15 PM [facility]   6/22/2022 10:40 AM Danya Lim MD [facility]     Additional Instructions for the Follow-ups that You Need to Schedule     Call MD With Problems / Concerns   As directed      Discharge Follow-up with Specified Provider: Schedule 6 week post partum appointment with Dr. Lim; 6 Weeks   As directed      To: Schedule 6 week post partum appointment with Dr. Lim    Follow Up: 6 Weeks               ZENON Ross  05/16/22  10:35 EDT  Csd

## 2022-05-20 ENCOUNTER — APPOINTMENT (OUTPATIENT)
Dept: PREADMISSION TESTING | Facility: HOSPITAL | Age: 32
End: 2022-05-20

## 2022-05-20 DIAGNOSIS — Z01.818 PRE-OP TESTING: Primary | ICD-10-CM

## 2022-06-22 ENCOUNTER — POSTPARTUM VISIT (OUTPATIENT)
Dept: OBSTETRICS AND GYNECOLOGY | Facility: CLINIC | Age: 32
End: 2022-06-22

## 2022-06-22 VITALS
WEIGHT: 154 LBS | SYSTOLIC BLOOD PRESSURE: 110 MMHG | HEIGHT: 64 IN | BODY MASS INDEX: 26.29 KG/M2 | DIASTOLIC BLOOD PRESSURE: 80 MMHG

## 2022-06-22 DIAGNOSIS — Z01.419 PAP TEST, AS PART OF ROUTINE GYNECOLOGICAL EXAMINATION: Primary | ICD-10-CM

## 2022-06-22 PROBLEM — Z34.90 PREGNANCY: Status: RESOLVED | Noted: 2021-10-01 | Resolved: 2022-06-22

## 2022-06-22 PROCEDURE — 0503F POSTPARTUM CARE VISIT: CPT | Performed by: OBSTETRICS & GYNECOLOGY

## 2022-06-22 NOTE — PROGRESS NOTES
"Chief Complaint   Patient presents with   • Postpartum Care       6 Week Postpartum Visit         Elma Lima is a 31 y.o.  s/p  at 38w weeks on [id number], who presents today for a 6 week postpartum check.      At the time of delivery were you diagnosed with any of the following: None. The laceration was 2nd degree and tail bone is still hurting she had xray in hospital that was neg for fracture. Has seen PT.   Patient does not wish to start BC today  Patient denies postpartum depression.  Patient describes bleeding as absent.  Patient is bottle feeding.  Desires contraceptive methods: None for contraception.  Patient denies bowel or bladder issues.    Postpartum Depression Screening Questionnaire: yes, no treatment indicated.  Baby Name: Joaquin  Baby Weight: 7lb 7.6oz  Baby Discharged: Discharged with Mom  Delivering Physician: Miya Muñoz    Last Completed Pap Smear     This patient has no relevant Health Maintenance data.        Is the patient due for a pap today? Yes.  Performed Today    The additional following portions of the patient's history were reviewed and updated as appropriate: allergies, current medications, past family history, past medical history, past social history, past surgical history and problem list.    Review of Systems   Constitutional: Negative for fever.   Eyes: Negative for blurred vision.   Cardiovascular: Negative for leg swelling.   Gastrointestinal: Negative for nausea and vomiting.   Genitourinary: Negative for vaginal bleeding and vaginal discharge.   Neurological: Negative for headache.     All other systems reviewed and are negative.     I have reviewed and agree with the HPI, ROS, and historical information as entered above. Danya Lim MD    /80   Ht 162.6 cm (64\")   Wt 69.9 kg (154 lb)   LMP 08/15/2021 (Exact Date)   BMI 26.43 kg/m²     Physical Exam  Vitals and nursing note reviewed. Exam conducted with a chaperone present.   Constitutional:       " Appearance: She is well-developed.   HENT:      Head: Normocephalic and atraumatic.   Neck:      Thyroid: No thyroid mass or thyromegaly.   Pulmonary:      Breath sounds: No rhonchi.   Abdominal:      Palpations: Abdomen is soft. Abdomen is not rigid. There is no mass.      Tenderness: There is no abdominal tenderness. There is no guarding.      Hernia: No hernia is present.   Genitourinary:     Vagina: Normal.      Cervix: Normal.      Uterus: Normal.    Musculoskeletal:      Cervical back: Normal range of motion. No muscular tenderness.   Neurological:      Mental Status: She is alert and oriented to person, place, and time.   Psychiatric:         Behavior: Behavior normal.             Assessment and Plan    Problem List Items Addressed This Visit    None     Visit Diagnoses     Pap test, as part of routine gynecological examination    -  Primary    Relevant Orders    LIQUID-BASED PAP SMEAR, [facility] ([facility])    Postpartum exam              1. S/p Vaginal delivery, 6 weeks postpartum.  Doing well.    2. Return to normal physical activity.  No pelvic restrictions.   3. Contraception: contraceptive methods: Condoms  4. Return in about 1 year (around 6/22/2023) for Annual physical.     Danya Lim MD  06/22/2022

## 2022-06-27 LAB — REF LAB TEST METHOD: NORMAL

## 2023-03-28 ENCOUNTER — TELEPHONE (OUTPATIENT)
Dept: OBSTETRICS AND GYNECOLOGY | Facility: CLINIC | Age: 33
End: 2023-03-28
Payer: COMMERCIAL

## 2023-03-28 NOTE — TELEPHONE ENCOUNTER
Caller: Elma Lima    Relationship to patient: Self    Best call back number: [phone]    Patient is needing:     PT HAS APPTS FOR U/S AND F/U ON 4/12/23    SHE WILL BE OUT OF TOWN ON THAT DATE (4/10/23 TO 4/12/23) AND NEEDS TO R/S    SHE IS WILLING TO GO TO EITHER LOCATION AND SPLIT APPTS IF NEEDED    PT COULD COME ANY TIME THE FIRST WEEK OF APRIL    PLEASE ADVISE    OKAY TO CALL ANYTIME  OKAY TO [facility]

## 2023-04-06 ENCOUNTER — INITIAL PRENATAL (OUTPATIENT)
Dept: OBSTETRICS AND GYNECOLOGY | Facility: CLINIC | Age: 33
End: 2023-04-06
Payer: COMMERCIAL

## 2023-04-06 VITALS — BODY MASS INDEX: 28.53 KG/M2 | WEIGHT: 166.2 LBS | DIASTOLIC BLOOD PRESSURE: 80 MMHG | SYSTOLIC BLOOD PRESSURE: 110 MMHG

## 2023-04-06 DIAGNOSIS — Z34.80 SUPERVISION OF OTHER NORMAL PREGNANCY, ANTEPARTUM: Primary | ICD-10-CM

## 2023-04-06 PROBLEM — O13.3 GESTATIONAL HYPERTENSION WITHOUT SIGNIFICANT PROTEINURIA DURING PREGNANCY IN THIRD TRIMESTER, ANTEPARTUM: Status: RESOLVED | Noted: 2022-05-14 | Resolved: 2023-04-06

## 2023-04-06 PROBLEM — Z67.91 RH NEGATIVE, ANTEPARTUM: Status: ACTIVE | Noted: 2023-04-06

## 2023-04-06 PROBLEM — O26.899 RH NEGATIVE, ANTEPARTUM: Status: ACTIVE | Noted: 2023-04-06

## 2023-04-06 PROBLEM — Z87.59 HISTORY OF GESTATIONAL HYPERTENSION: Status: ACTIVE | Noted: 2023-04-06

## 2023-04-06 PROCEDURE — 0501F PRENATAL FLOW SHEET: CPT

## 2023-04-06 RX ORDER — CALCIUM CARBONATE 200(500)MG
1 TABLET,CHEWABLE ORAL 2 TIMES DAILY PRN
COMMUNITY

## 2023-04-06 NOTE — PROGRESS NOTES
Initial ob visit     CC- Here for care of pregnancy        Elma Lima is a 32 y.o. female, , who presents for her first obstetrical visit.  Her last LMP was Patient's last menstrual period was 2023.. Her STANISLAV is 2023, by Last Menstrual Period. Current GA is 8w2d.     Initial positive test date : 3-, UPT        Her periods are: regular   Prior obstetric issues: gestational HTN  Patient's past medical history is significant for: HSV.  Family history of genetic issues (includes FOB): denies  Prior infections concerning in pregnancy (Rash, fever in last 2 weeks): pt was treated for a sinus infection about 2 weeks ago  Varicella Hx - history of chicken pox  Prior testing for Cystic Fibrosis Carrier or Sickle Cell Trait- no  Prepregnancy BMI - Body mass index is 28.53 kg/m².  History of STD: yes HSV  Ultrasound Today: yes    OB History    Para Term  AB Living   2 1 1 0 0 1   SAB IAB Ectopic Molar Multiple Live Births   0 0 0 0 0 1      # Outcome Date GA Lbr Emeterio/2nd Weight Sex Delivery Anes PTL Lv   2 Current            1 Term 22 38w6d 08:09 / 02:11 3390 g (7 lb 7.6 oz) M Vag-Spont EPI N DAVON       Additional Pertinent History   Last Pap :  Result: negative HPV: negative     Last Completed Pap Smear          PAP SMEAR (Every 3 Years) Next due on 2022  LIQUID-BASED PAP SMEAR, [facility] ([facility])              History of abnormal Pap smear: no  Family history of uterine, colon, breast, or ovarian cancer: yes - MGM and PGM had breast cancer  Feelings of Anxiety or Depression: no  Tobacco Usage?: No   Alcohol/Drug Use?: NO  Over the age of 35 at delivery: no  Desires Genetic Screening: Cell Free DNA  Flu Status: Declines    PMH    Current Outpatient Medications:   •  calcium carbonate (TUMS) 500 MG chewable tablet, Chew 1 tablet 2 (Two) Times a Day As Needed., Disp: , Rfl:   •  prenatal vitamin (prenatal, CLASSIC, vitamin) tablet, Take  by mouth  Daily., Disp: , Rfl:      Past Medical History:   Diagnosis Date   • HSV-1 (herpes simplex virus 1) infection    • Migraine 2007   • Rh incompatibility    • Tuberculosis Dec 2013 positive for latent    Treatment completed at [facility]   • Varicella Small child        Past Surgical History:   Procedure Laterality Date   • LAPAROSCOPIC CHOLECYSTECTOMY  8/12/22   • MANDIBLE FRACTURE SURGERY     • WISDOM TOOTH EXTRACTION         Review of Systems   Review of Systems   Gastrointestinal: Positive for nausea and vomiting.   All other systems reviewed and are negative.       She is vomiting 2-3x's daily.    Patient Denies: Spotting, Heavy bleeding and Cramping  All systems reviewed and otherwise normal.    I have reviewed and agree with the HPI, ROS, and historical information as entered above. Gisell RENATA Kessler, APRN    /80   Wt 75.4 kg (166 lb 3.2 oz)   LMP 02/07/2023   BMI 28.53 kg/m²     The additional following portions of the patient's history were reviewed and updated as appropriate: allergies, current medications, past family history, past medical history, past social history, past surgical history and problem list.    Physical Exam  General:  well developed; well nourished  no acute distress                                Assessment and Plan    Problem List Items Addressed This Visit    None  Visit Diagnoses     Supervision of other normal pregnancy, antepartum    -  Primary    Relevant Orders    Obstetric Panel    Chlamydia trachomatis, Neisseria gonorrhoeae, PCR w/ confirmation - Urine, Urine, Clean Catch    HIV-1 / O / 2 Ag / Antibody 4th Generation    Urinalysis With Microscopic - Urine, Clean Catch    Urine Culture - Urine, Urine, Clean Catch    Urine Drug Screen - Urine, Clean Catch          1. Pregnancy at 8w2d Vaginal ultrasound ; STANISLAV 11/14/23  2. Reviewed routine prenatal care with the office and educational materials given  3. Lab(s) Ordered  4. Discussed options for  genetic testing including first trimester nuchal translucency screen, genetic disease carrier testing, quadruple screen, and NIPT  5. Discontinue the use of all non-medicinal drugs and chemicals  6. Nausea/Vomiting - she does not desire medications at this time.  Discussed conservative ways to help with nausea. Patient will try over the counter unisom and b6, will notify office if doesn't help.  7. Patient is on Prenatal vitamins  8. Activity recommendation : 150 minutes/week of moderate intensity aerobic activity unless we limit for bleeding, hypertension or other pregnancy complication   9. Patient going to [place] at the end of June. Instructed to take baby aspirin 81mg prior to flight, long drive, or long train ride, VU.  Return in about 4 weeks (around 5/4/2023). Will desire Maternity 21 at that visit.      Gisell Kessler, APRN  04/06/2023

## 2023-04-11 LAB
ABO GROUP BLD: NORMAL
AMPHETAMINES UR QL SCN: NEGATIVE NG/ML
APPEARANCE UR: CLEAR
BACTERIA #/AREA URNS HPF: ABNORMAL /[HPF]
BACTERIA UR CULT: NO GROWTH
BACTERIA UR CULT: NORMAL
BARBITURATES UR QL SCN: NEGATIVE NG/ML
BASOPHILS # BLD AUTO: 0.1 X10E3/UL (ref 0–0.2)
BASOPHILS NFR BLD AUTO: 1 %
BENZODIAZ UR QL SCN: NEGATIVE NG/ML
BILIRUB UR QL STRIP: NEGATIVE
BLD GP AB SCN SERPL QL: NEGATIVE
BZE UR QL SCN: NEGATIVE NG/ML
C TRACH RRNA SPEC QL NAA+PROBE: NEGATIVE
CANNABINOIDS UR QL SCN: NEGATIVE NG/ML
CASTS URNS QL MICRO: ABNORMAL /LPF
COLOR UR: YELLOW
CREAT UR-MCNC: 13.3 MG/DL (ref 20–300)
EOSINOPHIL # BLD AUTO: 0.2 X10E3/UL (ref 0–0.4)
EOSINOPHIL NFR BLD AUTO: 2 %
EPI CELLS #/AREA URNS HPF: ABNORMAL /HPF (ref 0–10)
ERYTHROCYTE [DISTWIDTH] IN BLOOD BY AUTOMATED COUNT: 13.4 % (ref 11.7–15.4)
GLUCOSE UR QL STRIP: NEGATIVE
HBV SURFACE AG SERPL QL IA: NEGATIVE
HCT VFR BLD AUTO: 38.7 % (ref 34–46.6)
HCV IGG SERPL QL IA: NON REACTIVE
HGB BLD-MCNC: 13.2 G/DL (ref 11.1–15.9)
HGB UR QL STRIP: ABNORMAL
HIV 1+2 AB+HIV1 P24 AG SERPL QL IA: NON REACTIVE
IMM GRANULOCYTES # BLD AUTO: 0 X10E3/UL (ref 0–0.1)
IMM GRANULOCYTES NFR BLD AUTO: 0 %
KETONES UR QL STRIP: NEGATIVE
LABORATORY COMMENT REPORT: ABNORMAL
LEUKOCYTE ESTERASE UR QL STRIP: ABNORMAL
LYMPHOCYTES # BLD AUTO: 2.1 X10E3/UL (ref 0.7–3.1)
LYMPHOCYTES NFR BLD AUTO: 23 %
MCH RBC QN AUTO: 30.6 PG (ref 26.6–33)
MCHC RBC AUTO-ENTMCNC: 34.1 G/DL (ref 31.5–35.7)
MCV RBC AUTO: 90 FL (ref 79–97)
METHADONE UR QL SCN: NEGATIVE NG/ML
MICRO URNS: ABNORMAL
MONOCYTES # BLD AUTO: 0.6 X10E3/UL (ref 0.1–0.9)
MONOCYTES NFR BLD AUTO: 6 %
N GONORRHOEA RRNA SPEC QL NAA+PROBE: NEGATIVE
NEUTROPHILS # BLD AUTO: 6.5 X10E3/UL (ref 1.4–7)
NEUTROPHILS NFR BLD AUTO: 68 %
NITRITE UR QL STRIP: NEGATIVE
OPIATES UR QL SCN: NEGATIVE NG/ML
OXYCODONE+OXYMORPHONE UR QL SCN: NEGATIVE NG/ML
PCP UR QL: NEGATIVE NG/ML
PH UR STRIP: 7 [PH] (ref 5–7.5)
PH UR: 6.9 [PH] (ref 4.5–8.9)
PLATELET # BLD AUTO: 298 X10E3/UL (ref 150–450)
PROPOXYPH UR QL SCN: NEGATIVE NG/ML
PROT UR QL STRIP: NEGATIVE
RBC # BLD AUTO: 4.31 X10E6/UL (ref 3.77–5.28)
RBC #/AREA URNS HPF: ABNORMAL /HPF (ref 0–2)
RH BLD: NEGATIVE
RPR SER QL: NON REACTIVE
RUBV IGG SERPL IA-ACNC: 2.31 INDEX
SP GR UR STRIP: <=1.005 (ref 1–1.03)
SP GR UR: 1
UROBILINOGEN UR STRIP-MCNC: 0.2 MG/DL (ref 0.2–1)
WBC # BLD AUTO: 9.5 X10E3/UL (ref 3.4–10.8)
WBC #/AREA URNS HPF: >30 /HPF (ref 0–5)

## 2023-04-12 ENCOUNTER — LAB (OUTPATIENT)
Dept: OBSTETRICS AND GYNECOLOGY | Facility: CLINIC | Age: 33
End: 2023-04-12
Payer: COMMERCIAL

## 2023-04-12 DIAGNOSIS — Z34.91 PRENATAL CARE, FIRST TRIMESTER: Primary | ICD-10-CM

## 2023-05-03 ENCOUNTER — ROUTINE PRENATAL (OUTPATIENT)
Dept: OBSTETRICS AND GYNECOLOGY | Facility: CLINIC | Age: 33
End: 2023-05-03
Payer: COMMERCIAL

## 2023-05-03 VITALS — SYSTOLIC BLOOD PRESSURE: 110 MMHG | DIASTOLIC BLOOD PRESSURE: 78 MMHG | BODY MASS INDEX: 27.98 KG/M2 | WEIGHT: 163 LBS

## 2023-05-03 DIAGNOSIS — Z67.91 RH NEGATIVE, ANTEPARTUM: ICD-10-CM

## 2023-05-03 DIAGNOSIS — O26.899 RH NEGATIVE, ANTEPARTUM: ICD-10-CM

## 2023-05-03 DIAGNOSIS — Z87.59 HISTORY OF GESTATIONAL HYPERTENSION: Primary | ICD-10-CM

## 2023-05-03 DIAGNOSIS — Z3A.12 12 WEEKS GESTATION OF PREGNANCY: ICD-10-CM

## 2023-05-03 LAB
GLUCOSE UR STRIP-MCNC: NEGATIVE MG/DL
PROT UR STRIP-MCNC: NEGATIVE MG/DL

## 2023-05-03 NOTE — PROGRESS NOTES
OB FOLLOW UP  CC- Here for care of pregnancy        Elma Lima is a 32 y.o.  12w1d patient being seen today for her obstetrical follow up visit. Patient reports no complaints..     Her prenatal care is complicated by (and status) :   Patient Active Problem List   Diagnosis   • Herpes simplex type 2 (HSV-2) infection affecting pregnancy, antepartum   • History of gestational hypertension   • Rh negative, antepartum       Desires genetic testing?: Yes with Gender--- was neg--girl  Flu Status: Declines  Ultrasound Today: No    ROS -   Patient Reports : No Problems  Patient Denies: Loss of Fluid, Vaginal Spotting, Vision Changes, Headaches, Nausea  and Vomiting   Fetal Movement : to early  All other systems reviewed and are negative.     The additional following portions of the patient's history were reviewed and updated as appropriate: allergies and current medications.    I have reviewed and agree with the HPI, ROS, and historical information as entered above. Olena Chand, APRN    /78   Wt 73.9 kg (163 lb)   LMP 2023   BMI 27.98 kg/m²         EXAM:     Prenatal Vitals  BP: 110/78  Weight: 73.9 kg (163 lb)   Fetal Heart Rate: +          Urine Glucose Read-only: Negative  Urine Protein Read-only: Negative       Assessment and Plan    Problem List Items Addressed This Visit        Gravid and     History of gestational hypertension - Primary    Rh negative, antepartum   Other Visit Diagnoses     12 weeks gestation of pregnancy              1. Pregnancy at 12w1d  2. Labs reviewed from New OB Visit.  3. Counseled on genetic testing, carrier status and option for NT screen  4. Activity and Exercise discussed.  5. Patient is on Prenatal vitamins  Return in about 4 weeks (around 2023).   Start daily bASA    Olena Chand, APRN  2023

## 2023-05-31 ENCOUNTER — ROUTINE PRENATAL (OUTPATIENT)
Dept: OBSTETRICS AND GYNECOLOGY | Facility: CLINIC | Age: 33
End: 2023-05-31

## 2023-05-31 VITALS — BODY MASS INDEX: 28.49 KG/M2 | WEIGHT: 166 LBS | SYSTOLIC BLOOD PRESSURE: 110 MMHG | DIASTOLIC BLOOD PRESSURE: 80 MMHG

## 2023-05-31 DIAGNOSIS — Z67.91 RH NEGATIVE, ANTEPARTUM: ICD-10-CM

## 2023-05-31 DIAGNOSIS — B00.9 HERPES SIMPLEX TYPE 2 (HSV-2) INFECTION AFFECTING PREGNANCY, ANTEPARTUM: ICD-10-CM

## 2023-05-31 DIAGNOSIS — Z87.59 HISTORY OF GESTATIONAL HYPERTENSION: ICD-10-CM

## 2023-05-31 DIAGNOSIS — O26.899 RH NEGATIVE, ANTEPARTUM: ICD-10-CM

## 2023-05-31 DIAGNOSIS — O98.519 HERPES SIMPLEX TYPE 2 (HSV-2) INFECTION AFFECTING PREGNANCY, ANTEPARTUM: ICD-10-CM

## 2023-05-31 DIAGNOSIS — Z3A.16 16 WEEKS GESTATION OF PREGNANCY: Primary | ICD-10-CM

## 2023-05-31 PROBLEM — Z34.90 PREGNANCY: Status: ACTIVE | Noted: 2023-05-31

## 2023-05-31 PROCEDURE — 0502F SUBSEQUENT PRENATAL CARE: CPT | Performed by: OBSTETRICS & GYNECOLOGY

## 2023-05-31 RX ORDER — ASPIRIN 81 MG/1
81 TABLET, CHEWABLE ORAL DAILY
COMMUNITY

## 2023-06-13 ENCOUNTER — TELEPHONE (OUTPATIENT)
Dept: OBSTETRICS AND GYNECOLOGY | Facility: CLINIC | Age: 33
End: 2023-06-13
Payer: COMMERCIAL

## 2023-06-13 NOTE — TELEPHONE ENCOUNTER
18 0/7- Meg Patient.    Patient called with c/o sinus congestion, and was asking for safe to take medications.    Advised patient okay to take mucinex, robitussin, zyrtec/claritin, flonase or sudafed if needed.  Advised patient the COVID/Flu testing is recommended.  She reports her son has a head cold currently, and tested negative for all things.  Advised patient to stay well hydrated and rest.  Patient to call with any additional questions or concerns.    Continue prednisone immunosuppression  Continue pantoprazole for GI prophylaxis  Continue vit D supplement for osteoporosis prophylaxis    Evaluated on 10/11/18  · Continue prednisone and pantoprazole for GI protection  · Continue Vit D

## 2023-08-08 ENCOUNTER — TELEPHONE (OUTPATIENT)
Dept: OBSTETRICS AND GYNECOLOGY | Facility: CLINIC | Age: 33
End: 2023-08-08

## 2023-08-08 NOTE — TELEPHONE ENCOUNTER
Provider: DR CAROLINA   Caller: NOAH DEAN  Relationship to Patient: SELF    Reason for Call: PT IS 26WKS PREGNANT PTS MOTHER WAS EXPOSED TO SHINGLES AT WORK AND PT WANTS TO KNOW IF IT IS SAFE FOR HER MOTHER TO BE AROUND HER AND IF NOT HOW LONG SHOULD THEY AVOID CONTACT PLEASE CALL PT TO ADVISE

## 2023-08-08 NOTE — TELEPHONE ENCOUNTER
Patients mother was exposed to someone at work with shingles. Pt states that she did have the chicken pox as a child. Advised pt that as long as she has had chicken pox before or she has been vaccinated against chicken pox in the past then she should be fine to be around her mother. Pt Isai Alva RN

## 2023-08-09 ENCOUNTER — ROUTINE PRENATAL (OUTPATIENT)
Dept: OBSTETRICS AND GYNECOLOGY | Facility: CLINIC | Age: 33
End: 2023-08-09
Payer: COMMERCIAL

## 2023-08-09 ENCOUNTER — TELEPHONE (OUTPATIENT)
Dept: OBSTETRICS AND GYNECOLOGY | Facility: CLINIC | Age: 33
End: 2023-08-09
Payer: COMMERCIAL

## 2023-08-09 VITALS — SYSTOLIC BLOOD PRESSURE: 112 MMHG | BODY MASS INDEX: 27.77 KG/M2 | WEIGHT: 161.8 LBS | DIASTOLIC BLOOD PRESSURE: 70 MMHG

## 2023-08-09 DIAGNOSIS — O26.899 FEELING PELVIC PRESSURE DURING PREGNANCY, ANTEPARTUM: ICD-10-CM

## 2023-08-09 DIAGNOSIS — B37.31 YEAST VAGINITIS: ICD-10-CM

## 2023-08-09 DIAGNOSIS — R10.2 FEELING PELVIC PRESSURE DURING PREGNANCY, ANTEPARTUM: ICD-10-CM

## 2023-08-09 DIAGNOSIS — R10.2 PELVIC PAIN: Primary | ICD-10-CM

## 2023-08-09 DIAGNOSIS — Z3A.26 26 WEEKS GESTATION OF PREGNANCY: ICD-10-CM

## 2023-08-09 DIAGNOSIS — N89.8 VAGINAL DISCHARGE: ICD-10-CM

## 2023-08-09 LAB
BILIRUB BLD-MCNC: NEGATIVE MG/DL
CLARITY, POC: CLEAR
COLOR UR: ABNORMAL
GLUCOSE UR STRIP-MCNC: NEGATIVE MG/DL
KETONES UR QL: ABNORMAL
LEUKOCYTE EST, POC: NEGATIVE
NITRITE UR-MCNC: NEGATIVE MG/ML
PH UR: 7 [PH] (ref 5–8)
PROT UR STRIP-MCNC: NEGATIVE MG/DL
RBC # UR STRIP: NEGATIVE /UL
SP GR UR: 1.01 (ref 1–1.03)
UROBILINOGEN UR QL: ABNORMAL
WET PREP GENITAL: NORMAL

## 2023-08-09 NOTE — TELEPHONE ENCOUNTER
Provider: DR CAROLINA  Caller: NOAH DEAN  Phone Number: [phone]  Reason for Call: LAST NIGHT BEFORE BED PATIENT STARTED WITH A CONSTANT TIGHTNESS, PRESSURE, AND SOME INTENSE CRAMPS RANDOMLY//IT HURTS WORSE IF SHE TAKES A DEEP BREATH OR COUGHS//HAS A DULL ACHE IN HER BACK AND HEADACHE BUT NOT SURE IF THAT'S BECAUSE SHE HASN'T SLEPT BECAUSE SHE HASN'T BEEN ABLE TO GET COMFORTABLE//NOT SURE IF ITS CONTRACTIONS BECAUSE THEY DIDN'T FEEL LIKE THIS WITH PREVIOUS PREGNANCY//BABY IS STILL MOVING- BUT PATIENT STATED THAT SOMETIMES HER MOVEMENTS EVEN MAKE HER PAIN WORSE//PLEASE FOLLOW UP

## 2023-08-09 NOTE — TELEPHONE ENCOUNTER
Returned patient's call.  @ 26w 1d. Reports normal fetal movement. Denies any bleeding, leaking fluid, dysuria, urinary frequency, or  fever. Reports increased amounts of white-yellow vaginal discharge past couple of days and onset last night of constant pelvic pressure and tightness and intermittent moderate cramping. States it does not feel like the contractions she had in previous pregnancy. Has dull back ache and H/A but thinks may be related to not sleeping well last night. States the pelvic discomfort is increased with the pressure of coughing or taking a deep breath. Patient will go to our [place] office this morning for evaluation.

## 2023-08-09 NOTE — PROGRESS NOTES
OB FOLLOW UP  CC- Here for care of pregnancy        Elma Lima is a 32 y.o.  26w1d patient being seen today for increased thick  white-yellow vaginal discharge x2 days. No itching, burning, or odor.  Patient also reports moderate cramping that comes and goes, dull back pain, with headache. The pelvic discomfort is worse when coughing and taking a deep breath. Symptoms started last night. Patient has not tried taking tylenol but has tried a heating pad and a ice pack without relief. she has tried laying in different positions without any relief. Patient states she feels like her baby is pushing down    Her prenatal care is complicated by (and status) :    Patient Active Problem List   Diagnosis    Herpes simplex type 2 (HSV-2) infection affecting pregnancy, antepartum    History of gestational hypertension    Rh negative, antepartum    Pregnancy         Ultrasound Today: No.    ROS -   Patient Reports : headache, back pain, pelvic pain and pressure and feels like baby is pushing down   Patient Denies: Vaginal Spotting, Vision Changes, Nausea , and Contractions  Fetal Movement : normal  All other systems reviewed and are negative.       The additional following portions of the patient's history were reviewed and updated as appropriate: allergies, current medications, past family history, past medical history, past social history, past surgical history, and problem list.    I have reviewed and agree with the HPI, ROS, and historical information as entered above. Olena Chand, APRN      /70   Wt 73.4 kg (161 lb 12.8 oz)   LMP 2023   BMI 27.77 kg/mý       EXAM:     Prenatal Vitals  BP: 112/70  Weight: 73.4 kg (161 lb 12.8 oz)   Fetal Heart Rate: +   Dilation/Effacement/Station  Dilation: Closed  Station: -3            Assessment and Plan    Problem List Items Addressed This Visit          Gravid and     Pregnancy    Overview     Prev  term 7#8oz  cfDNA low risk          Other  Visit Diagnoses       Pelvic pain    -  Primary    Relevant Orders    POC Urinalysis Dipstick (Completed)    Urine Culture - Urine, Urine, Clean Catch    Vaginal discharge        Relevant Orders    POC Wet Prep (Completed)    US Ob Limited 1 + Fetuses    Yeast vaginitis        Feeling pelvic pressure during pregnancy, antepartum                Pregnancy at 26w1d  Fetal status reassuring.   Yeast noted on micro.  CCUA neg.  Cervix closed.  TOCO negative for contractions.  Enc go home eat, push flds, take Tylenol, warm bath, nap.  Rev EBER prec.  RTO prn worsening, new symptoms.    Olena Chand, APRN  08/09/2023

## 2023-08-11 LAB
BACTERIA UR CULT: NORMAL
BACTERIA UR CULT: NORMAL

## 2023-08-16 ENCOUNTER — ROUTINE PRENATAL (OUTPATIENT)
Dept: OBSTETRICS AND GYNECOLOGY | Facility: CLINIC | Age: 33
End: 2023-08-16
Payer: COMMERCIAL

## 2023-08-16 VITALS — DIASTOLIC BLOOD PRESSURE: 70 MMHG | SYSTOLIC BLOOD PRESSURE: 110 MMHG | WEIGHT: 163 LBS | BODY MASS INDEX: 27.98 KG/M2

## 2023-08-16 DIAGNOSIS — O26.899 RH NEGATIVE, ANTEPARTUM: ICD-10-CM

## 2023-08-16 DIAGNOSIS — Z3A.27 27 WEEKS GESTATION OF PREGNANCY: ICD-10-CM

## 2023-08-16 DIAGNOSIS — O98.519 HERPES SIMPLEX TYPE 2 (HSV-2) INFECTION AFFECTING PREGNANCY, ANTEPARTUM: ICD-10-CM

## 2023-08-16 DIAGNOSIS — Z34.82 ENCOUNTER FOR SUPERVISION OF OTHER NORMAL PREGNANCY IN SECOND TRIMESTER: ICD-10-CM

## 2023-08-16 DIAGNOSIS — Z87.59 HISTORY OF GESTATIONAL HYPERTENSION: Primary | ICD-10-CM

## 2023-08-16 DIAGNOSIS — Z67.91 RH NEGATIVE, ANTEPARTUM: ICD-10-CM

## 2023-08-16 DIAGNOSIS — B00.9 HERPES SIMPLEX TYPE 2 (HSV-2) INFECTION AFFECTING PREGNANCY, ANTEPARTUM: ICD-10-CM

## 2023-08-16 LAB
GLUCOSE UR STRIP-MCNC: NEGATIVE MG/DL
PROT UR STRIP-MCNC: NEGATIVE MG/DL

## 2023-08-16 NOTE — PROGRESS NOTES
OB FOLLOW UP  CC- Here for care of pregnancy        Elma Lima is a 32 y.o.  27w1d patient being seen today for her obstetrical follow up. Patient was evaluated last week for pelvic pressure and cramping, (negative evaluation for PTL) Patient reports it lasted for about 3 days but has since completely resolved. Rhogam/TDAP next visit. GTT today.     Patient undergoing Glucola testing today. She is due for her testing at 10:30am.       MBT: A-  Rhogam:  will need to be given at next visit per    28 week packet: reviewed with patient , counseled on fetal movement , pediatrician list reviewed, breast pump discussed, and childbirth classes reviewed  TDAP: will receive at next visit  Ultrasound Today: No    Her prenatal care is complicated by (and status) :    Patient Active Problem List   Diagnosis    Herpes simplex type 2 (HSV-2) infection affecting pregnancy, antepartum    History of gestational hypertension    Rh negative, antepartum    Pregnancy         ROS -   Patient Denies: Loss of Fluid, Vaginal Spotting, Vision Changes, Headaches, Nausea , Vomiting , Contractions, and Epigastric pain  Fetal Movement : normal    The additional following portions of the patient's history were reviewed and updated as appropriate: allergies, current medications, past family history, past medical history, past social history, past surgical history, and problem list.    I have reviewed and agree with the HPI, ROS, and historical information as entered above. Danya Lim MD      /70   Wt 73.9 kg (163 lb)   LMP 2023   BMI 27.98 kg/mý         EXAM:     Prenatal Vitals  BP: 110/70  Weight: 73.9 kg (163 lb)   Fetal Heart Rate: +      Fundal Height (cm): 26 cm        Urine Glucose Read-only: Negative  Urine Protein Read-only: Negative         Assessment and Plan    Problem List Items Addressed This Visit          Gynecologic and Obstetric Problems    Herpes simplex type 2 (HSV-2) infection  affecting pregnancy, antepartum    Overview     Plan suppression 36 wks            Other    History of gestational hypertension - Primary    Overview     Diagnosed in labor 38 wks  bASA at 12 w         Rh negative, antepartum    Overview     Needs Rhogam 30 wk visit         Pregnancy    Overview     Prev  term 7#8oz  cfDNA low risk  Plan US 32 wks          Other Visit Diagnoses       Encounter for supervision of other normal pregnancy in second trimester        Relevant Orders    CBC (No Diff)    Gestational Screen 1 Hr ([facility])    Antibody Screen    POC Urinalysis Dipstick (Completed)            Pregnancy at 27w1d. Rhogam and tdap next time.   1 hr Glucola, CBC, and antibody screen today   Fetal movement/PTL or Labor precautions  Activity and Exercise discussed.  Return in about 3 weeks (around 2023) for F/U Prenatal.    Danya Lim MD  2023

## 2023-08-17 LAB
BLD GP AB SCN SERPL QL: NEGATIVE
ERYTHROCYTE [DISTWIDTH] IN BLOOD BY AUTOMATED COUNT: 12.9 % (ref 12.3–15.4)
GLUCOSE 1H P 50 G GLC PO SERPL-MCNC: 81 MG/DL (ref 65–139)
HCT VFR BLD AUTO: 32.3 % (ref 34–46.6)
HGB BLD-MCNC: 10.9 G/DL (ref 12–15.9)
MCH RBC QN AUTO: 30.8 PG (ref 26.6–33)
MCHC RBC AUTO-ENTMCNC: 33.7 G/DL (ref 31.5–35.7)
MCV RBC AUTO: 91.2 FL (ref 79–97)
PLATELET # BLD AUTO: 230 10*3/MM3 (ref 140–450)
RBC # BLD AUTO: 3.54 10*6/MM3 (ref 3.77–5.28)
WBC # BLD AUTO: 8.36 10*3/MM3 (ref 3.4–10.8)

## 2023-09-06 ENCOUNTER — ROUTINE PRENATAL (OUTPATIENT)
Dept: OBSTETRICS AND GYNECOLOGY | Facility: CLINIC | Age: 33
End: 2023-09-06
Payer: COMMERCIAL

## 2023-09-06 VITALS — WEIGHT: 161 LBS | SYSTOLIC BLOOD PRESSURE: 110 MMHG | BODY MASS INDEX: 27.64 KG/M2 | DIASTOLIC BLOOD PRESSURE: 78 MMHG

## 2023-09-06 DIAGNOSIS — O98.519 HERPES SIMPLEX TYPE 2 (HSV-2) INFECTION AFFECTING PREGNANCY, ANTEPARTUM: ICD-10-CM

## 2023-09-06 DIAGNOSIS — Z87.59 HISTORY OF GESTATIONAL HYPERTENSION: ICD-10-CM

## 2023-09-06 DIAGNOSIS — B00.9 HERPES SIMPLEX TYPE 2 (HSV-2) INFECTION AFFECTING PREGNANCY, ANTEPARTUM: ICD-10-CM

## 2023-09-06 DIAGNOSIS — Z3A.30 30 WEEKS GESTATION OF PREGNANCY: ICD-10-CM

## 2023-09-06 DIAGNOSIS — O26.899 RH NEGATIVE, ANTEPARTUM: Primary | ICD-10-CM

## 2023-09-06 DIAGNOSIS — Z23 IMMUNIZATION DUE: ICD-10-CM

## 2023-09-06 DIAGNOSIS — Z67.91 RH NEGATIVE, ANTEPARTUM: Primary | ICD-10-CM

## 2023-09-06 LAB
EXPIRATION DATE: 1
GLUCOSE UR STRIP-MCNC: NEGATIVE MG/DL
Lab: 1
PROT UR STRIP-MCNC: NEGATIVE MG/DL

## 2023-09-06 NOTE — PROGRESS NOTES
OB FOLLOW UP  CC- Here for care of pregnancy        Elma Lima is a 32 y.o.  30w1d patient being seen today for her obstetrical follow up. Patient reports no complaints..           MBT: A-  Rhogam: will be given today.  28 week packet: reviewed with patient  and counseled on fetal movement   TDAP: given today  Flu Status: Declines  Ultrasound Today: No    Her prenatal care is complicated by (and status) :  None  Patient Active Problem List   Diagnosis    Herpes simplex type 2 (HSV-2) infection affecting pregnancy, antepartum    History of gestational hypertension    Rh negative, antepartum    Pregnancy         ROS -   Patient Reports : No Problems  Patient Denies: Loss of Fluid, Vaginal Spotting, Vision Changes, Headaches, Nausea , Vomiting , and Contractions  Fetal Movement : normal    The additional following portions of the patient's history were reviewed and updated as appropriate: allergies and current medications.    I have reviewed and agree with the HPI, ROS, and historical information as entered above. Danya Lim MD      /78   Wt 73 kg (161 lb)   LMP 2023   BMI 27.64 kg/m²         EXAM:     Prenatal Vitals  BP: 110/78  Weight: 73 kg (161 lb)   Fetal Heart Rate: +      Fundal Height (cm): 29 cm        Urine Glucose Read-only: Negative  Urine Protein Read-only: Negative         Assessment and Plan    Problem List Items Addressed This Visit          Gynecologic and Obstetric Problems    Herpes simplex type 2 (HSV-2) infection affecting pregnancy, antepartum    Overview     Plan suppression 36 wks            Other    History of gestational hypertension    Overview     Diagnosed in labor 38 wks  bASA at 12 w         Rh negative, antepartum - Primary    Overview     Needs Rhogam 30 wk visit         Relevant Orders    Rhogam Immune Globulin Immunization (Completed)    Pregnancy    Overview     Prev  term 7#8oz  cfDNA low risk  Plan US 32 wks         Relevant Orders    POC  Glucose, Urine, Qualitative, Dipstick (Completed)    POC Protein, Urine, Qualitative, Dipstick (Completed)     Other Visit Diagnoses       Immunization due        Relevant Orders    Tdap Vaccine => 8yo IM (BOOSTRIX) (Completed)            Pregnancy at 30w1d  Iron for anemia. Glucola normal.   TDAP given today and Rhogam today  Fetal movement/PTL or Labor precautions  U/S ordered at follow up  Activity and Exercise discussed.  Return in about 2 weeks (around 9/20/2023) for F/U Prenatal, U/S Next Visit.    Danya Lim MD  09/06/2023

## 2023-09-25 ENCOUNTER — ROUTINE PRENATAL (OUTPATIENT)
Dept: OBSTETRICS AND GYNECOLOGY | Facility: CLINIC | Age: 33
End: 2023-09-25

## 2023-09-25 VITALS — BODY MASS INDEX: 28.73 KG/M2 | SYSTOLIC BLOOD PRESSURE: 118 MMHG | DIASTOLIC BLOOD PRESSURE: 72 MMHG | WEIGHT: 167.4 LBS

## 2023-09-25 DIAGNOSIS — Z87.59 HISTORY OF GESTATIONAL HYPERTENSION: Primary | ICD-10-CM

## 2023-09-25 DIAGNOSIS — Z3A.32 32 WEEKS GESTATION OF PREGNANCY: ICD-10-CM

## 2023-09-25 DIAGNOSIS — Z34.93 PRENATAL CARE IN THIRD TRIMESTER: ICD-10-CM

## 2023-09-25 DIAGNOSIS — O26.899 RH NEGATIVE, ANTEPARTUM: ICD-10-CM

## 2023-09-25 DIAGNOSIS — Z67.91 RH NEGATIVE, ANTEPARTUM: ICD-10-CM

## 2023-09-25 PROCEDURE — 0502F SUBSEQUENT PRENATAL CARE: CPT | Performed by: NURSE PRACTITIONER

## 2023-09-25 NOTE — PROGRESS NOTES
OB FOLLOW UP  CC- Here for care of pregnancy        Elma Lima is a 32 y.o.  32w6d patient being seen today for her obstetrical follow up visit. Patient reports no complaints.     She denies LOF, vaginal spotting, headaches, vision changes, swelling or joy reynoso/contractions.  She reports adequate fetal movements, >10 movements in 10 hours.      Her prenatal care is complicated by (and status) :  history of gestational hypertension   Patient Active Problem List   Diagnosis    Herpes simplex type 2 (HSV-2) infection affecting pregnancy, antepartum    History of gestational hypertension    Rh negative, antepartum    Pregnancy     Ultrasound Today: No  Non Stress Test: No.      ROS -   Patient Reports : No Problems  Patient Denies: Loss of Fluid, Vaginal Spotting, Vision Changes, Headaches, Nausea , Vomiting , Contractions, and Epigastric pain  Fetal Movement : normal  All other systems reviewed and are negative.       The additional following portions of the patient's history were reviewed and updated as appropriate: allergies, current medications, past family history, past medical history, past social history, past surgical history, and problem list.    I have reviewed and agree with the HPI, ROS, and historical information as entered above. Zulay Gonzalez, APRN      /72   Wt 75.9 kg (167 lb 6.4 oz)   LMP 2023   BMI 28.73 kg/m²         EXAM:     Prenatal Vitals  BP: 118/72  Weight: 75.9 kg (167 lb 6.4 oz)                               Assessment and Plan    Problem List Items Addressed This Visit          Gravid and     History of gestational hypertension - Primary    Overview     Diagnosed in labor 38 wks  bASA at 12 w         Rh negative, antepartum    Overview     Needs Rhogam 30 wk visit         Pregnancy    Overview     Prev  term 7#8oz  cfDNA low risk  Plan US 32 wks- EFW- 69% (5-5), AC- 89%          Other Visit Diagnoses       Prenatal care in third trimester                 Pregnancy at 32w6d  Fetal status reassuring.  28 week labs reviewed.    Activity and Exercise discussed.  Fetal movement/PTL or Labor precautions  Discussed bASA for PIH prevention from 12 to 36wk  Return in about 2 weeks (around 10/9/2023) for GLROIA HOANG.  U/S reviewed with patient, EFW- 69% (5-5), RICHA- 20 cm     Zulay Gonzalez, APRN  09/25/2023

## 2023-10-06 RX ORDER — VALACYCLOVIR HYDROCHLORIDE 1 G/1
1000 TABLET, FILM COATED ORAL DAILY
Qty: 30 TABLET | Refills: 1 | Status: SHIPPED | OUTPATIENT
Start: 2023-10-06 | End: 2023-11-05

## 2023-10-10 NOTE — PROGRESS NOTES
OB FOLLOW UP  CC- Here for care of pregnancy        Elma Lima is a 32 y.o.  35w0d patient being seen today for her obstetrical follow up visit. Patient reports irregular contractions .     Her prenatal care is complicated by (and status) : None  Patient Active Problem List   Diagnosis    Herpes simplex type 2 (HSV-2) infection affecting pregnancy, antepartum    History of gestational hypertension    Rh negative, antepartum    Pregnancy       Flu Status: Declines  Ultrasound Today: No  Non Stress Test: No.    ROS -   Patient Reports : Contractions  Patient Denies: Loss of Fluid, Vaginal Spotting, Vision Changes, Headaches, Nausea , and Vomiting   Fetal Movement : normal  All other systems reviewed and are negative.       The additional following portions of the patient's history were reviewed and updated as appropriate: allergies and current medications.    I have reviewed and agree with the HPI, ROS, and historical information as entered above. Danya Lim MD      /82   Wt 76.2 kg (168 lb)   LMP 2023   BMI 28.84 kg/mý       EXAM:     Prenatal Vitals  BP: 120/82  Weight: 76.2 kg (168 lb)   Fetal Heart Rate: +               Urine Glucose Read-only: Negative  Urine Protein Read-only: Negative           Assessment and Plan    Problem List Items Addressed This Visit          Gynecologic and Obstetric Problems    Herpes simplex type 2 (HSV-2) infection affecting pregnancy, antepartum    Overview     Plan suppression 36 wks. Prodromal symtpoms at 34 weeks, valtrex started daily          Relevant Medications    valACYclovir (Valtrex) 1000 MG tablet       Other    History of gestational hypertension    Overview     Diagnosed in labor 38 wks  bASA at 12 w         Rh negative, antepartum    Overview     Needs Rhogam 30 wk visit         Pregnancy - Primary    Overview     Prev  term 7#8oz  cfDNA low risk  Plan US 32 wks- EFW- 69% (5-5), AC- 89%         Relevant Orders    POC Glucose, Urine,  Qualitative, Dipstick (Completed)    POC Protein, Urine, Qualitative, Dipstick (Completed)       Pregnancy at 35w0d  Had HSV prodromal symptoms 34 wks, and started valtrex treatment, and it quickly subsided. Discussed risks of viral shedding and risks to baby with vaginal delivery, but given this was a recurrent episode and quickly subsided, the risk at delivery should be low for transmission to baby. Will cont valtrex until delivery.  Fetal status reassuring.   Activity and Exercise discussed.  Fetal movement/PTL or Labor precautions  GBS next visit  Return in about 2 weeks (around 10/25/2023) for F/U Prenatal.    Danya Lim MD  10/11/2023

## 2023-10-11 ENCOUNTER — ROUTINE PRENATAL (OUTPATIENT)
Dept: OBSTETRICS AND GYNECOLOGY | Facility: CLINIC | Age: 33
End: 2023-10-11
Payer: COMMERCIAL

## 2023-10-11 VITALS — DIASTOLIC BLOOD PRESSURE: 82 MMHG | SYSTOLIC BLOOD PRESSURE: 120 MMHG | BODY MASS INDEX: 28.84 KG/M2 | WEIGHT: 168 LBS

## 2023-10-11 DIAGNOSIS — Z87.59 HISTORY OF GESTATIONAL HYPERTENSION: ICD-10-CM

## 2023-10-11 DIAGNOSIS — Z67.91 RH NEGATIVE, ANTEPARTUM: ICD-10-CM

## 2023-10-11 DIAGNOSIS — Z3A.35 35 WEEKS GESTATION OF PREGNANCY: Primary | ICD-10-CM

## 2023-10-11 DIAGNOSIS — O98.519 HERPES SIMPLEX TYPE 2 (HSV-2) INFECTION AFFECTING PREGNANCY, ANTEPARTUM: ICD-10-CM

## 2023-10-11 DIAGNOSIS — O26.899 RH NEGATIVE, ANTEPARTUM: ICD-10-CM

## 2023-10-11 DIAGNOSIS — B00.9 HERPES SIMPLEX TYPE 2 (HSV-2) INFECTION AFFECTING PREGNANCY, ANTEPARTUM: ICD-10-CM

## 2023-10-11 LAB
EXPIRATION DATE: 1
GLUCOSE UR STRIP-MCNC: NEGATIVE MG/DL
Lab: 1
PROT UR STRIP-MCNC: NEGATIVE MG/DL

## 2023-10-18 ENCOUNTER — ROUTINE PRENATAL (OUTPATIENT)
Dept: OBSTETRICS AND GYNECOLOGY | Facility: CLINIC | Age: 33
End: 2023-10-18
Payer: COMMERCIAL

## 2023-10-18 VITALS — BODY MASS INDEX: 28.25 KG/M2 | WEIGHT: 164.6 LBS | DIASTOLIC BLOOD PRESSURE: 74 MMHG | SYSTOLIC BLOOD PRESSURE: 108 MMHG

## 2023-10-18 DIAGNOSIS — Z87.59 HISTORY OF GESTATIONAL HYPERTENSION: ICD-10-CM

## 2023-10-18 DIAGNOSIS — Z67.91 RH NEGATIVE, ANTEPARTUM: ICD-10-CM

## 2023-10-18 DIAGNOSIS — Z36.85 ANTENATAL SCREENING FOR STREPTOCOCCUS B: ICD-10-CM

## 2023-10-18 DIAGNOSIS — O98.519 HERPES SIMPLEX TYPE 2 (HSV-2) INFECTION AFFECTING PREGNANCY, ANTEPARTUM: ICD-10-CM

## 2023-10-18 DIAGNOSIS — O26.899 RH NEGATIVE, ANTEPARTUM: ICD-10-CM

## 2023-10-18 DIAGNOSIS — Z34.93 PRENATAL CARE IN THIRD TRIMESTER: Primary | ICD-10-CM

## 2023-10-18 DIAGNOSIS — Z3A.36 36 WEEKS GESTATION OF PREGNANCY: ICD-10-CM

## 2023-10-18 DIAGNOSIS — B00.9 HERPES SIMPLEX TYPE 2 (HSV-2) INFECTION AFFECTING PREGNANCY, ANTEPARTUM: ICD-10-CM

## 2023-10-18 LAB
EXPIRATION DATE: 0
GLUCOSE UR STRIP-MCNC: NEGATIVE MG/DL
Lab: 0
PROT UR STRIP-MCNC: NEGATIVE MG/DL

## 2023-10-18 PROCEDURE — 0502F SUBSEQUENT PRENATAL CARE: CPT | Performed by: OBSTETRICS & GYNECOLOGY

## 2023-10-18 RX ORDER — FAMOTIDINE 20 MG/1
TABLET, FILM COATED ORAL
COMMUNITY
Start: 2023-10-11

## 2023-10-18 NOTE — PROGRESS NOTES
OB FOLLOW UP  CC- Here for care of pregnancy        Elma Lima is a 32 y.o.  36w1d patient being seen today for her obstetrical follow up visit. Patient reports no complaints..     Her prenatal care is complicated by (and status) : HSV2, Rh negative  Patient Active Problem List   Diagnosis    Herpes simplex type 2 (HSV-2) infection affecting pregnancy, antepartum    History of gestational hypertension    Rh negative, antepartum    Pregnancy       GBS Status: Done Today. She is not allergic to PCN.    Allergies   Allergen Reactions    Ceclor [Cefaclor] Rash          Flu Status: Declines  Her Delivery Plan is: Undecided    US today: no  Non Stress Test: No.    ROS -   Patient Reports : No Problems  Patient Denies: Loss of Fluid, Vaginal Spotting, Vision Changes, Headaches, and Contractions  Fetal Movement : normal  All other systems reviewed and are negative.       The additional following portions of the patient's history were reviewed and updated as appropriate: allergies, current medications, past family history, past medical history, past social history, past surgical history, and problem list.    I have reviewed and agree with the HPI, ROS, and historical information as entered above. Danya Lim MD        EXAM:     Prenatal Vitals  BP: 108/74  Weight: 74.7 kg (164 lb 9.6 oz)   Fetal Heart Rate: +       Dilation/Effacement/Station  Dilation: 1  Effacement (%): 70      Urine Glucose Read-only: Negative  Urine Protein Read-only: Negative           Assessment and Plan    Problem List Items Addressed This Visit          Gynecologic and Obstetric Problems    Herpes simplex type 2 (HSV-2) infection affecting pregnancy, antepartum    Overview     Plan suppression 36 wks. Prodromal symtpoms at 34 weeks, valtrex started daily          Relevant Medications    valACYclovir (Valtrex) 1000 MG tablet       Other    History of gestational hypertension    Overview     Diagnosed in labor 38 wks  bASA at 12 w          Rh negative, antepartum    Overview     Needs Rhogam 30 wk visit         Pregnancy    Overview     Prev  term 7#8oz  cfDNA low risk  Plan US 32 wks- EFW- 69% (5-5), AC- 89%          Other Visit Diagnoses       Prenatal care in third trimester    -  Primary    Relevant Orders    POC Protein, Urine, Qualitative, Dipstick (Completed)    POC Glucose, Urine, Qualitative, Dipstick (Completed)     screening for streptococcus B        Relevant Orders    Group B Streptococcus Culture - Swab, Vaginal/Rectum            Pregnancy at 36w1d  Fetal status reassuring.   Reviewed Pre-eclampsia signs/symptoms  Discussed IOL options with patient. Pt. desires IOL after 40 weeks. Will sched.   Delivery options reviewed with patient  Signs of labor reviewed  Kick counts reviewed  Activity and Exercise discussed.  Return in about 1 week (around 10/25/2023) for F/U Prenatal.    Danya Lim MD  10/18/2023

## 2023-10-22 LAB — B-HEM STREP SPEC QL CULT: NEGATIVE

## 2023-10-25 ENCOUNTER — ROUTINE PRENATAL (OUTPATIENT)
Dept: OBSTETRICS AND GYNECOLOGY | Facility: CLINIC | Age: 33
End: 2023-10-25
Payer: COMMERCIAL

## 2023-10-25 VITALS — SYSTOLIC BLOOD PRESSURE: 120 MMHG | BODY MASS INDEX: 29.01 KG/M2 | WEIGHT: 169 LBS | DIASTOLIC BLOOD PRESSURE: 80 MMHG

## 2023-10-25 DIAGNOSIS — O26.899 RH NEGATIVE, ANTEPARTUM: ICD-10-CM

## 2023-10-25 DIAGNOSIS — B00.9 HERPES SIMPLEX TYPE 2 (HSV-2) INFECTION AFFECTING PREGNANCY, ANTEPARTUM: ICD-10-CM

## 2023-10-25 DIAGNOSIS — Z87.59 HISTORY OF GESTATIONAL HYPERTENSION: ICD-10-CM

## 2023-10-25 DIAGNOSIS — Z67.91 RH NEGATIVE, ANTEPARTUM: ICD-10-CM

## 2023-10-25 DIAGNOSIS — Z3A.37 37 WEEKS GESTATION OF PREGNANCY: Primary | ICD-10-CM

## 2023-10-25 DIAGNOSIS — O98.519 HERPES SIMPLEX TYPE 2 (HSV-2) INFECTION AFFECTING PREGNANCY, ANTEPARTUM: ICD-10-CM

## 2023-10-25 LAB
EXPIRATION DATE: 1
GLUCOSE UR STRIP-MCNC: NEGATIVE MG/DL
Lab: 1
PROT UR STRIP-MCNC: NEGATIVE MG/DL

## 2023-10-25 PROCEDURE — 0502F SUBSEQUENT PRENATAL CARE: CPT | Performed by: OBSTETRICS & GYNECOLOGY

## 2023-10-25 NOTE — PROGRESS NOTES
OB FOLLOW UP  CC- Here for care of pregnancy        Elma Lima is a 32 y.o.  37w1d patient being seen today for her obstetrical follow up visit. Patient reports irregular contractions .     Her prenatal care is complicated by (and status) : None  Patient Active Problem List   Diagnosis    Herpes simplex type 2 (HSV-2) infection affecting pregnancy, antepartum    History of gestational hypertension    Rh negative, antepartum    Pregnancy       GBS Status: positive  Strep Gp B BRYN   Date Value Ref Range Status   2022 Positive (A) Negative Final     Comment:     Centers for Disease Control and Prevention (CDC) and American Congress  of Obstetricians and Gynecologists (ACOG) guidelines for prevention of   group B streptococcal (GBS) disease specify co-collection of  a vaginal and rectal swab specimen to maximize sensitivity of GBS  detection. Per the CDC and ACOG, swabbing both the lower vagina and  rectum substantially increases the yield of detection compared with  sampling the vagina alone.  Penicillin G, ampicillin, or cefazolin are indicated for intrapartum  prophylaxis of  GBS colonization. Reflex susceptibility  testing should be performed prior to use of clindamycin only on GBS  isolates from penicillin-allergic women who are considered a high risk  for anaphylaxis. Treatment with vancomycin without additional testing  is warranted if resistance to clindamycin is noted.       Strep Gp B Culture   Date Value Ref Range Status   10/18/2023 Negative Negative Final     Comment:     Centers for Disease Control and Prevention (CDC) and American Congress  of Obstetricians and Gynecologists (ACOG) guidelines for prevention of   group B streptococcal (GBS) disease specify co-collection of  a vaginal and rectal swab specimen to maximize sensitivity of GBS  detection. Per the CDC and ACOG, swabbing both the lower vagina and  rectum substantially increases the yield of  detection compared with  sampling the vagina alone.  Penicillin G, ampicillin, or cefazolin are indicated for intrapartum  prophylaxis of  GBS colonization. Reflex susceptibility  testing should be performed prior to use of clindamycin only on GBS  isolates from penicillin-allergic women who are considered a high risk  for anaphylaxis. Treatment with vancomycin without additional testing  is warranted if resistance to clindamycin is noted.           Allergies   Allergen Reactions    Ceclor [Cefaclor] Rash          Flu Status: Declines  Her Delivery Plan is: Desires IOL at 39wks. Scheduled    US today: no  Non Stress Test: No.    ROS -   Patient Reports : Contractions  Patient Denies: Loss of Fluid, Vaginal Spotting, Vision Changes, Headaches, Nausea , and Vomiting   Fetal Movement : normal  All other systems reviewed and are negative.       The additional following portions of the patient's history were reviewed and updated as appropriate: allergies and current medications.    I have reviewed and agree with the HPI, ROS, and historical information as entered above. Danya Lim MD        EXAM:     Prenatal Vitals  BP: 120/80  Weight: 76.7 kg (169 lb)   Fetal Heart Rate: +       Dilation/Effacement/Station  Dilation: 2  Effacement (%): 70      Urine Glucose Read-only: Negative  Urine Protein Read-only: Negative           Assessment and Plan    Problem List Items Addressed This Visit          Gynecologic and Obstetric Problems    Herpes simplex type 2 (HSV-2) infection affecting pregnancy, antepartum    Overview     Plan suppression 36 wks. Prodromal symtpoms at 34 weeks, valtrex started daily          Relevant Medications    valACYclovir (Valtrex) 1000 MG tablet       Other    History of gestational hypertension    Overview     Diagnosed in labor 38 wks  bASA at 12 w         Rh negative, antepartum    Overview     Needs Rhogam 30 wk visit         Pregnancy - Primary    Overview     Prev  term  7#8oz  cfDNA low risk  Plan US 32 wks- EFW- 69% (5-5), AC- 89%         Relevant Orders    POC Glucose, Urine, Qualitative, Dipstick (Completed)    POC Protein, Urine, Qualitative, Dipstick (Completed)       Pregnancy at 37w1d  Fetal status reassuring.   Reviewed Pre-eclampsia signs/symptoms  Discussed IOL options with patient. Pt. desires IOL at 39 weeks.   Delivery options reviewed with patient  Signs of labor reviewed  Kick counts reviewed  Activity and Exercise discussed.  Return in about 1 week (around 11/1/2023) for F/U Prenatal.    Danya Lim MD  10/25/2023

## 2023-11-01 ENCOUNTER — ROUTINE PRENATAL (OUTPATIENT)
Dept: OBSTETRICS AND GYNECOLOGY | Facility: CLINIC | Age: 33
End: 2023-11-01
Payer: COMMERCIAL

## 2023-11-01 VITALS — WEIGHT: 168.4 LBS | DIASTOLIC BLOOD PRESSURE: 80 MMHG | BODY MASS INDEX: 28.91 KG/M2 | SYSTOLIC BLOOD PRESSURE: 120 MMHG

## 2023-11-01 DIAGNOSIS — Z3A.38 38 WEEKS GESTATION OF PREGNANCY: ICD-10-CM

## 2023-11-01 DIAGNOSIS — O98.519 HERPES SIMPLEX TYPE 2 (HSV-2) INFECTION AFFECTING PREGNANCY, ANTEPARTUM: ICD-10-CM

## 2023-11-01 DIAGNOSIS — B00.9 HERPES SIMPLEX TYPE 2 (HSV-2) INFECTION AFFECTING PREGNANCY, ANTEPARTUM: ICD-10-CM

## 2023-11-01 DIAGNOSIS — Z34.83 ENCOUNTER FOR SUPERVISION OF OTHER NORMAL PREGNANCY IN THIRD TRIMESTER: ICD-10-CM

## 2023-11-01 DIAGNOSIS — O26.899 RH NEGATIVE, ANTEPARTUM: ICD-10-CM

## 2023-11-01 DIAGNOSIS — Z67.91 RH NEGATIVE, ANTEPARTUM: ICD-10-CM

## 2023-11-01 DIAGNOSIS — Z87.59 HISTORY OF GESTATIONAL HYPERTENSION: Primary | ICD-10-CM

## 2023-11-01 LAB
GLUCOSE UR STRIP-MCNC: NEGATIVE MG/DL
PROT UR STRIP-MCNC: NEGATIVE MG/DL

## 2023-11-01 RX ORDER — FERROUS SULFATE 7.5 MG/0.5
SYRINGE (EA) ORAL DAILY
Status: ON HOLD | COMMUNITY

## 2023-11-01 NOTE — PROGRESS NOTES
OB FOLLOW UP  CC- Here for care of pregnancy        Elma Lima is a 32 y.o.  38w1d patient being seen today for her obstetrical follow up visit. Patient reports occasional contractions.      Her prenatal care is complicated by (and status) :   Patient Active Problem List   Diagnosis    Herpes simplex type 2 (HSV-2) infection affecting pregnancy, antepartum    History of gestational hypertension    Rh negative, antepartum    Pregnancy       GBS Status: NEGATIVE THIS PREGNANCY   Strep Gp B BRYN   Date Value Ref Range Status   2022 Positive (A) Negative Final     Comment:     Centers for Disease Control and Prevention (CDC) and American Congress  of Obstetricians and Gynecologists (ACOG) guidelines for prevention of   group B streptococcal (GBS) disease specify co-collection of  a vaginal and rectal swab specimen to maximize sensitivity of GBS  detection. Per the CDC and ACOG, swabbing both the lower vagina and  rectum substantially increases the yield of detection compared with  sampling the vagina alone.  Penicillin G, ampicillin, or cefazolin are indicated for intrapartum  prophylaxis of  GBS colonization. Reflex susceptibility  testing should be performed prior to use of clindamycin only on GBS  isolates from penicillin-allergic women who are considered a high risk  for anaphylaxis. Treatment with vancomycin without additional testing  is warranted if resistance to clindamycin is noted.       Strep Gp B Culture   Date Value Ref Range Status   10/18/2023 Negative Negative Final     Comment:     Centers for Disease Control and Prevention (CDC) and American Congress  of Obstetricians and Gynecologists (ACOG) guidelines for prevention of   group B streptococcal (GBS) disease specify co-collection of  a vaginal and rectal swab specimen to maximize sensitivity of GBS  detection. Per the CDC and ACOG, swabbing both the lower vagina and  rectum substantially increases the  yield of detection compared with  sampling the vagina alone.  Penicillin G, ampicillin, or cefazolin are indicated for intrapartum  prophylaxis of  GBS colonization. Reflex susceptibility  testing should be performed prior to use of clindamycin only on GBS  isolates from penicillin-allergic women who are considered a high risk  for anaphylaxis. Treatment with vancomycin without additional testing  is warranted if resistance to clindamycin is noted.           Allergies   Allergen Reactions    Ceclor [Cefaclor] Rash          Flu Status: Declines  Her Delivery Plan is: Desires IOL at 39wks. Scheduled    US today: no  Non Stress Test: No.    ROS -   Patient Denies: Loss of Fluid, Vaginal Spotting, Vision Changes, Headaches, Nausea , Vomiting , and Epigastric pain  Fetal Movement : normal  All other systems reviewed and are negative.       The additional following portions of the patient's history were reviewed and updated as appropriate: allergies, current medications, past family history, past medical history, past social history, past surgical history, and problem list.    I have reviewed and agree with the HPI, ROS, and historical information as entered above. Danya Lim MD        EXAM:     Prenatal Vitals  BP: 120/80  Weight: 76.4 kg (168 lb 6.4 oz)   Fetal Heart Rate: +       Dilation/Effacement/Station  Dilation: 2  Effacement (%): 80      Urine Glucose Read-only: Negative  Urine Protein Read-only: Negative           Assessment and Plan    Problem List Items Addressed This Visit          Gynecologic and Obstetric Problems    Herpes simplex type 2 (HSV-2) infection affecting pregnancy, antepartum    Overview     Plan suppression 36 wks. Prodromal symtpoms at 34 weeks, valtrex started daily          Relevant Medications    valACYclovir (Valtrex) 1000 MG tablet       Other    History of gestational hypertension - Primary    Overview     Diagnosed in labor 38 wks  bASA at 12 w         Relevant Orders     POC Urinalysis Dipstick (Completed)    Rh negative, antepartum    Overview     Needs Rhogam 30 wk visit         Pregnancy    Overview     Prev  term 7#8oz  cfDNA low risk  Plan US 32 wks- EFW- 69% (5-5), AC- 89%          Other Visit Diagnoses       Encounter for supervision of other normal pregnancy in third trimester        Relevant Orders    POC Urinalysis Dipstick (Completed)            Pregnancy at 38w1d  Fetal status reassuring.   Reviewed Pre-eclampsia signs/symptoms  Delivery options reviewed with patient  Signs of labor reviewed  Kick counts reviewed  Activity and Exercise discussed.  Return in about 1 week (around 2023) for F/U Prenatal.    Danya Lim MD  2023

## 2023-11-06 ENCOUNTER — TELEPHONE (OUTPATIENT)
Dept: OBSTETRICS AND GYNECOLOGY | Facility: CLINIC | Age: 33
End: 2023-11-06

## 2023-11-06 ENCOUNTER — ANESTHESIA (OUTPATIENT)
Dept: LABOR AND DELIVERY | Facility: HOSPITAL | Age: 33
End: 2023-11-06
Payer: COMMERCIAL

## 2023-11-06 ENCOUNTER — HOSPITAL ENCOUNTER (INPATIENT)
Facility: HOSPITAL | Age: 33
LOS: 2 days | Discharge: HOME | End: 2023-11-08
Attending: OBSTETRICS & GYNECOLOGY | Admitting: OBSTETRICS & GYNECOLOGY
Payer: COMMERCIAL

## 2023-11-06 ENCOUNTER — ANESTHESIA EVENT (OUTPATIENT)
Dept: LABOR AND DELIVERY | Facility: HOSPITAL | Age: 33
End: 2023-11-06
Payer: COMMERCIAL

## 2023-11-06 PROBLEM — Z37.9 NORMAL LABOR: Status: ACTIVE | Noted: 2023-11-06

## 2023-11-06 LAB
ABO GROUP BLD: NORMAL
ALP SERPL-CCNC: 124 U/L (ref 39–117)
ALT SERPL W P-5'-P-CCNC: 10 U/L (ref 1–33)
AST SERPL-CCNC: 30 U/L (ref 1–32)
BILIRUB SERPL-MCNC: 0.3 MG/DL (ref 0–1.2)
BLD GP AB SCN SERPL QL: POSITIVE
CREAT SERPL-MCNC: 0.47 MG/DL (ref 0.57–1)
DEPRECATED RDW RBC AUTO: 48.7 FL (ref 37–54)
ERYTHROCYTE [DISTWIDTH] IN BLOOD BY AUTOMATED COUNT: 14.8 % (ref 12.3–15.4)
HCT VFR BLD AUTO: 33 % (ref 34–46.6)
HGB BLD-MCNC: 11.3 G/DL (ref 12–15.9)
LDH SERPL-CCNC: 193 U/L (ref 135–214)
MCH RBC QN AUTO: 30.8 PG (ref 26.6–33)
MCHC RBC AUTO-ENTMCNC: 34.2 G/DL (ref 31.5–35.7)
MCV RBC AUTO: 89.9 FL (ref 79–97)
PLATELET # BLD AUTO: 249 10*3/MM3 (ref 140–450)
PMV BLD AUTO: 10.5 FL (ref 6–12)
POC AMNISURE: POSITIVE
RBC # BLD AUTO: 3.67 10*6/MM3 (ref 3.77–5.28)
RESIDUAL RHIG DETECTED: NORMAL
RH BLD: NEGATIVE
T&S EXPIRATION DATE: NORMAL
URATE SERPL-MCNC: 3.5 MG/DL (ref 2.4–5.7)
WBC NRBC COR # BLD: 12.58 10*3/MM3 (ref 3.4–10.8)

## 2023-11-06 PROCEDURE — 25810000003 LACTATED RINGERS SOLUTION: Performed by: OBSTETRICS & GYNECOLOGY

## 2023-11-06 PROCEDURE — 36415 COLL VENOUS BLD VENIPUNCTURE: CPT | Performed by: OBSTETRICS & GYNECOLOGY

## 2023-11-06 PROCEDURE — 86850 RBC ANTIBODY SCREEN: CPT | Performed by: OBSTETRICS & GYNECOLOGY

## 2023-11-06 PROCEDURE — 0HQ9XZZ REPAIR PERINEUM SKIN, EXTERNAL APPROACH: ICD-10-PCS | Performed by: OBSTETRICS & GYNECOLOGY

## 2023-11-06 PROCEDURE — 83615 LACTATE (LD) (LDH) ENZYME: CPT | Performed by: OBSTETRICS & GYNECOLOGY

## 2023-11-06 PROCEDURE — 84112 EVAL AMNIOTIC FLUID PROTEIN: CPT | Performed by: OBSTETRICS & GYNECOLOGY

## 2023-11-06 PROCEDURE — 85027 COMPLETE CBC AUTOMATED: CPT | Performed by: OBSTETRICS & GYNECOLOGY

## 2023-11-06 PROCEDURE — 84450 TRANSFERASE (AST) (SGOT): CPT | Performed by: OBSTETRICS & GYNECOLOGY

## 2023-11-06 PROCEDURE — 25010000002 FENTANYL CITRATE (PF) 50 MCG/ML SOLUTION: Performed by: ANESTHESIOLOGY

## 2023-11-06 PROCEDURE — 25010000002 ROPIVACAINE PER 1 MG: Performed by: ANESTHESIOLOGY

## 2023-11-06 PROCEDURE — 86901 BLOOD TYPING SEROLOGIC RH(D): CPT | Performed by: OBSTETRICS & GYNECOLOGY

## 2023-11-06 PROCEDURE — 84460 ALANINE AMINO (ALT) (SGPT): CPT | Performed by: OBSTETRICS & GYNECOLOGY

## 2023-11-06 PROCEDURE — 82565 ASSAY OF CREATININE: CPT | Performed by: OBSTETRICS & GYNECOLOGY

## 2023-11-06 PROCEDURE — 84075 ASSAY ALKALINE PHOSPHATASE: CPT | Performed by: OBSTETRICS & GYNECOLOGY

## 2023-11-06 PROCEDURE — 51703 INSERT BLADDER CATH COMPLEX: CPT

## 2023-11-06 PROCEDURE — C1755 CATHETER, INTRASPINAL: HCPCS | Performed by: ANESTHESIOLOGY

## 2023-11-06 PROCEDURE — 86870 RBC ANTIBODY IDENTIFICATION: CPT | Performed by: OBSTETRICS & GYNECOLOGY

## 2023-11-06 PROCEDURE — 25810000003 LACTATED RINGERS PER 1000 ML: Performed by: OBSTETRICS & GYNECOLOGY

## 2023-11-06 PROCEDURE — 84550 ASSAY OF BLOOD/URIC ACID: CPT | Performed by: OBSTETRICS & GYNECOLOGY

## 2023-11-06 PROCEDURE — 59400 OBSTETRICAL CARE: CPT | Performed by: OBSTETRICS & GYNECOLOGY

## 2023-11-06 PROCEDURE — 59025 FETAL NON-STRESS TEST: CPT

## 2023-11-06 PROCEDURE — 82247 BILIRUBIN TOTAL: CPT | Performed by: OBSTETRICS & GYNECOLOGY

## 2023-11-06 PROCEDURE — 25010000002 BUPIVACAINE (PF) 0.25 % SOLUTION: Performed by: ANESTHESIOLOGY

## 2023-11-06 PROCEDURE — 86900 BLOOD TYPING SEROLOGIC ABO: CPT | Performed by: OBSTETRICS & GYNECOLOGY

## 2023-11-06 PROCEDURE — C1755 CATHETER, INTRASPINAL: HCPCS

## 2023-11-06 RX ORDER — METHYLERGONOVINE MALEATE 0.2 MG/ML
200 INJECTION INTRAVENOUS ONCE AS NEEDED
Status: DISCONTINUED | OUTPATIENT
Start: 2023-11-06 | End: 2023-11-06 | Stop reason: HOSPADM

## 2023-11-06 RX ORDER — LIDOCAINE HYDROCHLORIDE AND EPINEPHRINE 15; 5 MG/ML; UG/ML
INJECTION, SOLUTION EPIDURAL AS NEEDED
Status: DISCONTINUED | OUTPATIENT
Start: 2023-11-06 | End: 2023-11-06 | Stop reason: SURG

## 2023-11-06 RX ORDER — ROPIVACAINE HYDROCHLORIDE 2 MG/ML
15 INJECTION, SOLUTION EPIDURAL; INFILTRATION; PERINEURAL CONTINUOUS
Status: DISCONTINUED | OUTPATIENT
Start: 2023-11-06 | End: 2023-11-06

## 2023-11-06 RX ORDER — SODIUM CHLORIDE 0.9 % (FLUSH) 0.9 %
10 SYRINGE (ML) INJECTION EVERY 12 HOURS SCHEDULED
Status: DISCONTINUED | OUTPATIENT
Start: 2023-11-06 | End: 2023-11-06 | Stop reason: HOSPADM

## 2023-11-06 RX ORDER — HYDROCORTISONE 25 MG/G
1 CREAM TOPICAL AS NEEDED
Status: DISCONTINUED | OUTPATIENT
Start: 2023-11-06 | End: 2023-11-08 | Stop reason: HOSPADM

## 2023-11-06 RX ORDER — VALACYCLOVIR HYDROCHLORIDE 500 MG/1
500 TABLET, FILM COATED ORAL 2 TIMES DAILY
COMMUNITY
End: 2023-11-08

## 2023-11-06 RX ORDER — MAGNESIUM CARB/ALUMINUM HYDROX 105-160MG
30 TABLET,CHEWABLE ORAL ONCE
Status: DISCONTINUED | OUTPATIENT
Start: 2023-11-06 | End: 2023-11-06 | Stop reason: HOSPADM

## 2023-11-06 RX ORDER — FENTANYL CITRATE 50 UG/ML
INJECTION, SOLUTION INTRAMUSCULAR; INTRAVENOUS AS NEEDED
Status: DISCONTINUED | OUTPATIENT
Start: 2023-11-06 | End: 2023-11-06 | Stop reason: SURG

## 2023-11-06 RX ORDER — CALCIUM CARBONATE 500 MG/1
2 TABLET, CHEWABLE ORAL ONCE
Status: DISCONTINUED | OUTPATIENT
Start: 2023-11-06 | End: 2023-11-06

## 2023-11-06 RX ORDER — CARBOPROST TROMETHAMINE 250 UG/ML
250 INJECTION, SOLUTION INTRAMUSCULAR AS NEEDED
Status: DISCONTINUED | OUTPATIENT
Start: 2023-11-06 | End: 2023-11-06 | Stop reason: HOSPADM

## 2023-11-06 RX ORDER — CALCIUM CARBONATE 500 MG/1
1 TABLET, CHEWABLE ORAL 3 TIMES DAILY PRN
Status: DISCONTINUED | OUTPATIENT
Start: 2023-11-06 | End: 2023-11-08 | Stop reason: HOSPADM

## 2023-11-06 RX ORDER — LIDOCAINE HYDROCHLORIDE 10 MG/ML
0.5 INJECTION, SOLUTION EPIDURAL; INFILTRATION; INTRACAUDAL; PERINEURAL ONCE AS NEEDED
Status: DISCONTINUED | OUTPATIENT
Start: 2023-11-06 | End: 2023-11-06 | Stop reason: HOSPADM

## 2023-11-06 RX ORDER — MORPHINE SULFATE 2 MG/ML
2 INJECTION, SOLUTION INTRAMUSCULAR; INTRAVENOUS
Status: DISCONTINUED | OUTPATIENT
Start: 2023-11-06 | End: 2023-11-06 | Stop reason: HOSPADM

## 2023-11-06 RX ORDER — OXYTOCIN/0.9 % SODIUM CHLORIDE 30/500 ML
999 PLASTIC BAG, INJECTION (ML) INTRAVENOUS ONCE
Status: COMPLETED | OUTPATIENT
Start: 2023-11-06 | End: 2023-11-06

## 2023-11-06 RX ORDER — ONDANSETRON 4 MG/1
4 TABLET, FILM COATED ORAL EVERY 6 HOURS PRN
Status: DISCONTINUED | OUTPATIENT
Start: 2023-11-06 | End: 2023-11-08 | Stop reason: HOSPADM

## 2023-11-06 RX ORDER — ONDANSETRON 2 MG/ML
4 INJECTION INTRAMUSCULAR; INTRAVENOUS EVERY 6 HOURS PRN
Status: DISCONTINUED | OUTPATIENT
Start: 2023-11-06 | End: 2023-11-06 | Stop reason: HOSPADM

## 2023-11-06 RX ORDER — LIDOCAINE HCL/EPINEPHRINE/PF 2%-1:200K
VIAL (ML) INJECTION AS NEEDED
Status: DISCONTINUED | OUTPATIENT
Start: 2023-11-06 | End: 2023-11-06 | Stop reason: SURG

## 2023-11-06 RX ORDER — ACETAMINOPHEN 325 MG/1
650 TABLET ORAL EVERY 6 HOURS PRN
Status: DISCONTINUED | OUTPATIENT
Start: 2023-11-06 | End: 2023-11-08 | Stop reason: HOSPADM

## 2023-11-06 RX ORDER — EPHEDRINE SULFATE 5 MG/ML
10 INJECTION INTRAVENOUS
Status: DISCONTINUED | OUTPATIENT
Start: 2023-11-06 | End: 2023-11-06 | Stop reason: HOSPADM

## 2023-11-06 RX ORDER — ONDANSETRON 2 MG/ML
4 INJECTION INTRAMUSCULAR; INTRAVENOUS EVERY 6 HOURS PRN
Status: DISCONTINUED | OUTPATIENT
Start: 2023-11-06 | End: 2023-11-08 | Stop reason: HOSPADM

## 2023-11-06 RX ORDER — METOCLOPRAMIDE HYDROCHLORIDE 5 MG/ML
10 INJECTION INTRAMUSCULAR; INTRAVENOUS ONCE AS NEEDED
Status: DISCONTINUED | OUTPATIENT
Start: 2023-11-06 | End: 2023-11-06 | Stop reason: HOSPADM

## 2023-11-06 RX ORDER — BUPIVACAINE HYDROCHLORIDE 2.5 MG/ML
INJECTION, SOLUTION EPIDURAL; INFILTRATION; INTRACAUDAL AS NEEDED
Status: DISCONTINUED | OUTPATIENT
Start: 2023-11-06 | End: 2023-11-06 | Stop reason: SURG

## 2023-11-06 RX ORDER — MISOPROSTOL 200 UG/1
800 TABLET ORAL AS NEEDED
Status: DISCONTINUED | OUTPATIENT
Start: 2023-11-06 | End: 2023-11-06 | Stop reason: HOSPADM

## 2023-11-06 RX ORDER — DIPHENHYDRAMINE HYDROCHLORIDE 50 MG/ML
12.5 INJECTION INTRAMUSCULAR; INTRAVENOUS EVERY 8 HOURS PRN
Status: DISCONTINUED | OUTPATIENT
Start: 2023-11-06 | End: 2023-11-06 | Stop reason: HOSPADM

## 2023-11-06 RX ORDER — ONDANSETRON 4 MG/1
4 TABLET, FILM COATED ORAL EVERY 6 HOURS PRN
Status: DISCONTINUED | OUTPATIENT
Start: 2023-11-06 | End: 2023-11-06 | Stop reason: HOSPADM

## 2023-11-06 RX ORDER — IBUPROFEN 600 MG/1
600 TABLET ORAL EVERY 6 HOURS PRN
Status: DISCONTINUED | OUTPATIENT
Start: 2023-11-06 | End: 2023-11-08 | Stop reason: HOSPADM

## 2023-11-06 RX ORDER — ACETAMINOPHEN 325 MG/1
650 TABLET ORAL EVERY 4 HOURS PRN
Status: DISCONTINUED | OUTPATIENT
Start: 2023-11-06 | End: 2023-11-06 | Stop reason: HOSPADM

## 2023-11-06 RX ORDER — SODIUM CHLORIDE 0.9 % (FLUSH) 0.9 %
10 SYRINGE (ML) INJECTION AS NEEDED
Status: DISCONTINUED | OUTPATIENT
Start: 2023-11-06 | End: 2023-11-06 | Stop reason: HOSPADM

## 2023-11-06 RX ORDER — PROMETHAZINE HYDROCHLORIDE 12.5 MG/1
12.5 TABLET ORAL EVERY 4 HOURS PRN
Status: DISCONTINUED | OUTPATIENT
Start: 2023-11-06 | End: 2023-11-08 | Stop reason: HOSPADM

## 2023-11-06 RX ORDER — DOCUSATE SODIUM 100 MG/1
100 CAPSULE, LIQUID FILLED ORAL 2 TIMES DAILY
Status: DISCONTINUED | OUTPATIENT
Start: 2023-11-06 | End: 2023-11-08 | Stop reason: HOSPADM

## 2023-11-06 RX ORDER — BISACODYL 10 MG
10 SUPPOSITORY, RECTAL RECTAL DAILY PRN
Status: DISCONTINUED | OUTPATIENT
Start: 2023-11-07 | End: 2023-11-08 | Stop reason: HOSPADM

## 2023-11-06 RX ORDER — OXYTOCIN/0.9 % SODIUM CHLORIDE 30/500 ML
125 PLASTIC BAG, INJECTION (ML) INTRAVENOUS CONTINUOUS PRN
Status: DISCONTINUED | OUTPATIENT
Start: 2023-11-06 | End: 2023-11-08 | Stop reason: HOSPADM

## 2023-11-06 RX ORDER — EPHEDRINE SULFATE 5 MG/ML
INJECTION INTRAVENOUS
Status: DISCONTINUED
Start: 2023-11-06 | End: 2023-11-08 | Stop reason: HOSPADM

## 2023-11-06 RX ORDER — SODIUM CHLORIDE, SODIUM LACTATE, POTASSIUM CHLORIDE, CALCIUM CHLORIDE 600; 310; 30; 20 MG/100ML; MG/100ML; MG/100ML; MG/100ML
125 INJECTION, SOLUTION INTRAVENOUS CONTINUOUS
Status: DISCONTINUED | OUTPATIENT
Start: 2023-11-06 | End: 2023-11-06

## 2023-11-06 RX ORDER — SODIUM CHLORIDE 0.9 % (FLUSH) 0.9 %
1-10 SYRINGE (ML) INJECTION AS NEEDED
Status: DISCONTINUED | OUTPATIENT
Start: 2023-11-06 | End: 2023-11-08 | Stop reason: HOSPADM

## 2023-11-06 RX ORDER — FAMOTIDINE 10 MG/ML
20 INJECTION, SOLUTION INTRAVENOUS ONCE AS NEEDED
Status: DISCONTINUED | OUTPATIENT
Start: 2023-11-06 | End: 2023-11-06 | Stop reason: HOSPADM

## 2023-11-06 RX ORDER — CITRIC ACID/SODIUM CITRATE 334-500MG
30 SOLUTION, ORAL ORAL ONCE
Status: DISCONTINUED | OUTPATIENT
Start: 2023-11-06 | End: 2023-11-06 | Stop reason: HOSPADM

## 2023-11-06 RX ORDER — HYDROCODONE BITARTRATE AND ACETAMINOPHEN 5; 325 MG/1; MG/1
2 TABLET ORAL EVERY 6 HOURS PRN
Status: DISCONTINUED | OUTPATIENT
Start: 2023-11-06 | End: 2023-11-08 | Stop reason: HOSPADM

## 2023-11-06 RX ORDER — SODIUM CHLORIDE 9 MG/ML
40 INJECTION, SOLUTION INTRAVENOUS AS NEEDED
Status: DISCONTINUED | OUTPATIENT
Start: 2023-11-06 | End: 2023-11-06 | Stop reason: HOSPADM

## 2023-11-06 RX ORDER — ONDANSETRON 2 MG/ML
4 INJECTION INTRAMUSCULAR; INTRAVENOUS ONCE AS NEEDED
Status: DISCONTINUED | OUTPATIENT
Start: 2023-11-06 | End: 2023-11-06 | Stop reason: HOSPADM

## 2023-11-06 RX ORDER — HYDROCODONE BITARTRATE AND ACETAMINOPHEN 5; 325 MG/1; MG/1
1 TABLET ORAL EVERY 4 HOURS PRN
Status: DISCONTINUED | OUTPATIENT
Start: 2023-11-06 | End: 2023-11-08 | Stop reason: HOSPADM

## 2023-11-06 RX ORDER — MISOPROSTOL 200 UG/1
800 TABLET ORAL AS NEEDED
Status: DISCONTINUED | OUTPATIENT
Start: 2023-11-06 | End: 2023-11-08 | Stop reason: HOSPADM

## 2023-11-06 RX ORDER — OXYTOCIN/0.9 % SODIUM CHLORIDE 30/500 ML
250 PLASTIC BAG, INJECTION (ML) INTRAVENOUS CONTINUOUS
Status: ACTIVE | OUTPATIENT
Start: 2023-11-06 | End: 2023-11-06

## 2023-11-06 RX ADMIN — LIDOCAINE HYDROCHLORIDE AND EPINEPHRINE 2 ML: 15; 5 INJECTION, SOLUTION EPIDURAL at 10:06

## 2023-11-06 RX ADMIN — SODIUM CHLORIDE, POTASSIUM CHLORIDE, SODIUM LACTATE AND CALCIUM CHLORIDE 1000 ML: 600; 310; 30; 20 INJECTION, SOLUTION INTRAVENOUS at 09:09

## 2023-11-06 RX ADMIN — WITCH HAZEL: 500 SOLUTION RECTAL; TOPICAL at 15:17

## 2023-11-06 RX ADMIN — LIDOCAINE HYDROCHLORIDE AND EPINEPHRINE 3 ML: 15; 5 INJECTION, SOLUTION EPIDURAL at 10:05

## 2023-11-06 RX ADMIN — BUPIVACAINE HYDROCHLORIDE 12 ML: 2.5 INJECTION, SOLUTION EPIDURAL; INFILTRATION; INTRACAUDAL; PERINEURAL at 10:08

## 2023-11-06 RX ADMIN — SODIUM CHLORIDE, POTASSIUM CHLORIDE, SODIUM LACTATE AND CALCIUM CHLORIDE 125 ML/HR: 600; 310; 30; 20 INJECTION, SOLUTION INTRAVENOUS at 09:30

## 2023-11-06 RX ADMIN — ROPIVACAINE HYDROCHLORIDE 15 ML/HR: 2 INJECTION, SOLUTION EPIDURAL; INFILTRATION; PERINEURAL at 10:12

## 2023-11-06 RX ADMIN — Medication 1 APPLICATION: at 15:17

## 2023-11-06 RX ADMIN — Medication: at 15:17

## 2023-11-06 RX ADMIN — IBUPROFEN 600 MG: 600 TABLET, FILM COATED ORAL at 20:06

## 2023-11-06 RX ADMIN — ACETAMINOPHEN 650 MG: 325 TABLET ORAL at 07:13

## 2023-11-06 RX ADMIN — IBUPROFEN 600 MG: 600 TABLET, FILM COATED ORAL at 14:11

## 2023-11-06 RX ADMIN — LIDOCAINE HYDROCHLORIDE AND EPINEPHRINE 3 ML: 20; 5 INJECTION, SOLUTION EPIDURAL; INFILTRATION; INTRACAUDAL; PERINEURAL at 10:11

## 2023-11-06 RX ADMIN — HYDROCORTISONE 2.5% 1 APPLICATION: 25 CREAM TOPICAL at 15:17

## 2023-11-06 RX ADMIN — CALCIUM CARBONATE (ANTACID) CHEW TAB 500 MG 1 TABLET: 500 CHEW TAB at 15:17

## 2023-11-06 RX ADMIN — Medication 999 ML/HR: at 11:18

## 2023-11-06 RX ADMIN — FENTANYL CITRATE 100 MCG: 50 INJECTION, SOLUTION INTRAMUSCULAR; INTRAVENOUS at 10:07

## 2023-11-06 RX ADMIN — DOCUSATE SODIUM 100 MG: 100 CAPSULE, LIQUID FILLED ORAL at 20:06

## 2023-11-06 NOTE — ANESTHESIA PROCEDURE NOTES
Labor Epidural      Patient reassessed immediately prior to procedure    Patient location during procedure: OB  Performed By  Anesthesiologist: Carroll Lafleur DO  Preanesthetic Checklist  Completed: patient identified, IV checked, site marked, risks and benefits discussed, surgical consent, monitors and equipment checked, pre-op evaluation and timeout performed  Prep:  Pt Position:sitting  Sterile Tech:gloves, mask, sterile barrier and cap  Prep:chlorhexidine gluconate and isopropyl alcohol  Monitoring:blood pressure monitoring and continuous pulse oximetry  Epidural Block Procedure:  Approach:midline  Guidance:landmark technique and palpation technique  Location:L3-L4  Needle Type:Tuohy  Needle Gauge:17 G  Loss of Resistance Medium: air  Loss of Resistance: 4cm  Cath Depth at skin:10 cm  Paresthesia: none  Aspiration:negative  Test Dose:negative  Number of Attempts: 1  Post Assessment:  Dressing:secured with tape and occlusive dressing applied (Tegaderm Placed)  Pt Tolerance:patient tolerated the procedure well with no apparent complications  Complications:no

## 2023-11-06 NOTE — L&D DELIVERY NOTE
2023    Patient:Elma Liam    MR#:[id number]    Vaginal Delivery Note  32 y.o. yo female  at 38w6d      Normal labor       Delivery     Delivery: Vaginal, Spontaneous     YOB: 2023    Time of Birth: 11:15 AM      Anesthesia: Epidural     Delivering clinician:     Forceps?   No   Vacuum? No    Shoulder dystocia present: No     Pt progressed to complete, pushed less than 10 min and delivered without difficulty. Placenta delivered intact. 1st degree repaired.      Infant    Findings: female  infant     Infant observations: Weight: 3270 g (7 lb 3.3 oz)     Observations/Comments:        Apgars: 8  @ 1 minute /    9  @ 5 minutes         Placenta, Cord, and Fluid    Placenta delivered  Spontaneous  at  2023 11:17 AM     Cord: 3 vessels  present.   Nuchal Cord?  yes; Number of nuchal loops present:  1    Cord blood obtained: Yes    Cord gases obtained:  No    Cord gas results: Pending         Repair    Episiotomy: No   Lacerations: Yes  Laceration Information  Laceration Repaired?   Perineal:  1st  yes   Periurethral:       Labial:       Sulcus:       Vaginal:       Cervical:         Suture used for repair: 2-0 Vicryl     Estimated Blood Loss:   150mls.         Complications  none    Disposition  Mother to Mother Baby/Postpartum  in stable condition currently.  Baby to remains with mom  in stable condition currently.                Danya Lim MD  23  11:32 EST

## 2023-11-06 NOTE — LACTATION NOTE
11/06/23 1740   Maternal Information   Date of Referral 11/06/23   Person Making Referral lactation consultant  (courtesy visit for new delivery. Hx: BF 1st child for 4 weeks. Infant had several oral ties & mom states they were revised & baby was able to latch to bottle without a problem.)   Maternal Reason for Referral other (see comments)  (see above note)   Maternal Assessment   Breast Size Issue none   Breast Shape Bilateral:;round   Breast Density Bilateral:;soft   Nipples Bilateral:;everted   Left Nipple Symptoms intact;nontender   Right Nipple Symptoms intact;nontender   Maternal Infant Feeding   Maternal Emotional State independent;receptive   Infant Positioning cross-cradle  (Left)   Signs of Milk Transfer deep jaw excursions noted   Comfort Measures Before/During Feeding suction broken using finger;maternal position adjusted;latch adjusted;infant position adjusted   Latch Assistance minimal assistance   Support Person Involvement actively supporting mother   Milk Expression/Equipment   Breast Pump Type other (see comments)  (Pt would like a Spectra pump from [employer]. Will have LC dispense tomorrow.)   Breast Pumping   Breast Pumping Interventions   (Pt understands she can pump for short or missed feeds.)

## 2023-11-06 NOTE — PLAN OF CARE
Problem: Adult Inpatient Plan of Care  Goal: Plan of Care Review  Outcome: Ongoing, Progressing     Problem: Adult Inpatient Plan of Care  Goal: Optimal Comfort and Wellbeing  Intervention: Provide Person-Centered Care  Recent Flowsheet Documentation  Taken 11/6/2023 0713 by Shaye Harden RN  Trust Relationship/Rapport:   care explained   choices provided   emotional support provided   empathic listening provided   questions answered   questions encouraged   reassurance provided   thoughts/feelings acknowledged   Goal Outcome Evaluation:    Patient declined offer of pain interventions and is aware of her options including pain medication, preparation for epidural placement and potential issues with availability.  Patient remains focused and is breathing well during contractions and remains pleasant between contractions.  Patient agrees to call me for any changes or desire for pain interventions.

## 2023-11-07 LAB
BASOPHILS # BLD AUTO: 0.03 10*3/MM3 (ref 0–0.2)
BASOPHILS NFR BLD AUTO: 0.3 % (ref 0–1.5)
DEPRECATED RDW RBC AUTO: 51.3 FL (ref 37–54)
EOSINOPHIL # BLD AUTO: 0.14 10*3/MM3 (ref 0–0.4)
EOSINOPHIL NFR BLD AUTO: 1.3 % (ref 0.3–6.2)
ERYTHROCYTE [DISTWIDTH] IN BLOOD BY AUTOMATED COUNT: 15.2 % (ref 12.3–15.4)
HCT VFR BLD AUTO: 27.9 % (ref 34–46.6)
HGB BLD-MCNC: 9.3 G/DL (ref 12–15.9)
IMM GRANULOCYTES # BLD AUTO: 0.06 10*3/MM3 (ref 0–0.05)
IMM GRANULOCYTES NFR BLD AUTO: 0.6 % (ref 0–0.5)
LYMPHOCYTES # BLD AUTO: 2.39 10*3/MM3 (ref 0.7–3.1)
LYMPHOCYTES NFR BLD AUTO: 22.4 % (ref 19.6–45.3)
MCH RBC QN AUTO: 31.2 PG (ref 26.6–33)
MCHC RBC AUTO-ENTMCNC: 33.3 G/DL (ref 31.5–35.7)
MCV RBC AUTO: 93.6 FL (ref 79–97)
MONOCYTES # BLD AUTO: 0.82 10*3/MM3 (ref 0.1–0.9)
MONOCYTES NFR BLD AUTO: 7.7 % (ref 5–12)
NEUTROPHILS NFR BLD AUTO: 67.7 % (ref 42.7–76)
NEUTROPHILS NFR BLD AUTO: 7.21 10*3/MM3 (ref 1.7–7)
NRBC BLD AUTO-RTO: 0 /100 WBC (ref 0–0.2)
PLATELET # BLD AUTO: 186 10*3/MM3 (ref 140–450)
PMV BLD AUTO: 10.6 FL (ref 6–12)
RBC # BLD AUTO: 2.98 10*6/MM3 (ref 3.77–5.28)
WBC NRBC COR # BLD: 10.65 10*3/MM3 (ref 3.4–10.8)

## 2023-11-07 PROCEDURE — 85025 COMPLETE CBC W/AUTO DIFF WBC: CPT | Performed by: OBSTETRICS & GYNECOLOGY

## 2023-11-07 PROCEDURE — 0503F POSTPARTUM CARE VISIT: CPT

## 2023-11-07 RX ADMIN — ACETAMINOPHEN 650 MG: 325 TABLET ORAL at 08:38

## 2023-11-07 RX ADMIN — CALCIUM CARBONATE (ANTACID) CHEW TAB 500 MG 1 TABLET: 500 CHEW TAB at 05:36

## 2023-11-07 RX ADMIN — IBUPROFEN 600 MG: 600 TABLET, FILM COATED ORAL at 12:22

## 2023-11-07 RX ADMIN — IBUPROFEN 600 MG: 600 TABLET, FILM COATED ORAL at 03:23

## 2023-11-07 RX ADMIN — ACETAMINOPHEN 650 MG: 325 TABLET ORAL at 00:37

## 2023-11-07 RX ADMIN — IBUPROFEN 600 MG: 600 TABLET, FILM COATED ORAL at 20:35

## 2023-11-07 RX ADMIN — ACETAMINOPHEN 650 MG: 325 TABLET ORAL at 17:01

## 2023-11-07 RX ADMIN — DOCUSATE SODIUM 100 MG: 100 CAPSULE, LIQUID FILLED ORAL at 08:38

## 2023-11-07 RX ADMIN — DOCUSATE SODIUM 100 MG: 100 CAPSULE, LIQUID FILLED ORAL at 20:35

## 2023-11-07 NOTE — LACTATION NOTE
11/07/23 1515   Maternal Information   Date of Referral 11/07/23   Person Making Referral lactation consultant  (courtesy)   Maternal Reason for Referral breastfeeding currently  (Mom reports BF'ing is going well.  States infant is easier to latch on left breast than right.  Feeding CC hold.  Encouraged mom to try FB hold on R breast next feeding & call for lactation if difficulty persists.  S2 pump given from [employer]'s stock.)   Maternal Infant Feeding   Maternal Emotional State independent;relaxed   Infant Positioning cross-cradle   Pain with Feeding no   Latch Assistance none needed   Milk Expression/Equipment   Equipment for Home Use breast pump provided  (Spectra given from [employer]'s stock)

## 2023-11-07 NOTE — H&P
History and Physical:    Subjective     Chief Complaint   Patient presents with    Leaking Fluid       Elma Lima is a 32 y.o. year old  with an Estimated Date of Delivery: 23 currently at 38w6d presenting with regular contractions and leaking fluid.    Prenatal care has been with Danya Lim MD.  It has been significant for  history of HSV, on suppression .  No symptoms of outbreak/prodrome.         Review of Systems  Pertinent items are noted in HPI.     Past Medical History:   Diagnosis Date    HSV-1 (herpes simplex virus 1) infection     Migraine 2007    Pregnancy 2023    Rh incompatibility     Tuberculosis Dec 2013 positive for latent    Treatment completed at [facility] [place] [place] Infectious disease    Varicella Small child     Past Surgical History:   Procedure Laterality Date    LAPAROSCOPIC CHOLECYSTECTOMY  22    MANDIBLE FRACTURE SURGERY      WISDOM TOOTH EXTRACTION       Family History   Problem Relation Age of Onset    Breast cancer Maternal Grandmother     Stroke Maternal Grandmother     Breast cancer Paternal Grandmother      Social History     Tobacco Use    Smoking status: Never    Smokeless tobacco: Never   Vaping Use    Vaping Use: Never used   Substance Use Topics    Alcohol use: Not Currently    Drug use: Never     Medications Prior to Admission   Medication Sig Dispense Refill Last Dose    calcium carbonate (TUMS) 500 MG chewable tablet Chew 1 tablet 2 (Two) Times a Day As Needed.   2023    famotidine (Pepcid) 20 MG tablet    2023    ferrous sulfate (SUREKHA-IN-SOL) 15 mg/mL drops Take  by mouth Daily.   2023    prenatal vitamin (prenatal, CLASSIC, vitamin) tablet Take  by mouth Daily.   2023    valACYclovir (VALTREX) 500 MG tablet Take 1 tablet by mouth 2 (Two) Times a Day.   2023    aspirin 81 MG chewable tablet Chew 1 tablet Daily.       [] valACYclovir (Valtrex) 1000 MG tablet Take 1 tablet by mouth Daily for 30 days. 30 tablet 1   "    Allergies:  Ceclor [cefaclor]  OB History    Para Term  AB Living   2 2 2 0 0 2   SAB IAB Ectopic Molar Multiple Live Births   0 0 0 0 0 2      # Outcome Date GA Lbr Emeterio/2nd Weight Sex Delivery Anes PTL Lv   2 Term 23 38w6d  3270 g (7 lb 3.3 oz) F Vag-Spont EPI N DAVON      Complications: Fetal Intolerance   1 Term 22 38w6d 08:09 / 02:11 3390 g (7 lb 7.6 oz) M Vag-Spont EPI N DAVON      Obstetric Comments   FOB #1 Pregnancy #1 & 2         Objective     /79 (BP Location: Right arm)   Pulse 74   Temp 98.1 °F (36.7 °C) (Oral)   Resp 18   Ht 162.6 cm (64\")   Wt 76.2 kg (168 lb)   LMP 2023   SpO2 100%   Breastfeeding Yes   BMI 28.84 kg/m²     Physical Exam    General:  No acute distress           Abdomen: Gravid, nontender       FHT's: reactive    Cervix:    [place]: Contraction are q3-4min.    No HSV lesions seen    Lab Review   External Prenatal Results       Pregnancy Outside Results - Transcribed From Office Records - See Scanned Records For Details       Test Value Date Time    ABO  A  23 045    Rh  Negative  23 0450    Antibody Screen  Positive  23 0450       Negative  23 1034       Negative  23 1452    Varicella IgG       Rubella  2.31 index 23 1452    Hgb  9.3 g/dL 23 0615       11.3 g/dL 23 0450       10.9 g/dL 23 1034       13.2 g/dL 23 1452    Hct  27.9 % 23 0615       33.0 % 23 0450       32.3 % 23 1034       38.7 % 23 1452    Glucose Fasting GTT       Glucose Tolerance Test 1 hour       Glucose Tolerance Test 3 hour       Gonorrhea (discrete)  Negative  23 1452    Chlamydia (discrete)  Negative  23 1452    RPR  Non Reactive  23 1452    VDRL       Syphilis Antibody       HBsAg  Negative  23 1452    Herpes Simplex Virus PCR       Herpes Simplex VIrus Culture       HIV  Non Reactive  23 1452    Hep C RNA Quant PCR       Hep C Antibody  Non Reactive  " 04/06/23 1452    AFP       Group B Strep  Negative  10/18/23 1115    GBS Susceptibility to Clindamycin       GBS Susceptibility to Erythromycin       Fetal Fibronectin       Genetic Testing, Maternal Blood                 Drug Screening       Test Value Date Time    Urine Drug Screen       Amphetamine Screen  Negative ng/mL 04/06/23 1452    Barbiturate Screen  Negative ng/mL 04/06/23 1452    Benzodiazepine Screen  Negative ng/mL 04/06/23 1452    Methadone Screen  Negative ng/mL 04/06/23 1452    Phencyclidine Screen  Negative ng/mL 04/06/23 1452    Opiates Screen       THC Screen       Cocaine Screen       Propoxyphene Screen  Negative ng/mL 04/06/23 1452    Buprenorphine Screen       Methamphetamine Screen       Oxycodone Screen       Tricyclic Antidepressants Screen                 Legend    ^: Historical                                Assessment & Plan     ASSESSMENT  IUP at 38w6d  labor and rupture of membranes   3. GBBSnegative  PLAN  Admit to labor and delivery   2.   Exp management         Danya Lim MD  11/7/2023@

## 2023-11-07 NOTE — PROGRESS NOTES
11/7/2023  PPD #1    Subjective   Elma feels well.  Patient describes her lochia less than menses.  Pain is well controlled       Objective   Temp: Temp:  [97.9 °F (36.6 °C)-98.7 °F (37.1 °C)] 98.1 °F (36.7 °C) Temp src: Oral   BP: BP: ()/(50-92) 122/79        Pulse: Heart Rate:  [] 74  RR: Resp:  [16-18] 18    General:  No acute distress   Abdomen: Fundus firm and beneath umbilicus   Pelvis: deferred     Lab Results   Component Value Date    WBC 10.65 11/07/2023    HGB 9.3 (L) 11/07/2023    HCT 27.9 (L) 11/07/2023    MCV 93.6 11/07/2023     11/07/2023    HEPBSAG Negative 04/06/2023       Assessment  PPD# 1 after vaginal delivery. Doing well.   Asymptomatic Anemia. VSS.   Infant: Female.     Plan  Routine postpartum care. Advance.       This note has been electronically signed.    Jessica Maurer, APRN  November 7, 2023

## 2023-11-07 NOTE — ANESTHESIA POSTPROCEDURE EVALUATION
Patient: Elma Lima    Procedure Summary       Date: 11/06/23 Room / Location:     Anesthesia Start: 0958 Anesthesia Stop: 1117    Procedure: LABOR ANALGESIA Diagnosis:     Scheduled Providers:  Provider: Carroll Lafleur DO    Anesthesia Type: epidural ASA Status: 2            Anesthesia Type: epidural    Vitals  Vitals Value Taken Time   /79 11/07/23 0746   Temp 98.1 °F (36.7 °C) 11/07/23 0746   Pulse 74 11/07/23 0746   Resp 18 11/07/23 0746   SpO2 100 % 11/06/23 1007           Post Anesthesia Care and Evaluation    Patient location during evaluation: bedside  Patient participation: complete - patient participated  Level of consciousness: awake and alert  Pain management: adequate    Airway patency: patent  Anesthetic complications: No anesthetic complications    Cardiovascular status: acceptable  Respiratory status: acceptable  Hydration status: acceptable  Post Neuraxial Block status: Motor and sensory function returned to baseline and No signs or symptoms of PDPH

## 2023-11-08 VITALS
DIASTOLIC BLOOD PRESSURE: 69 MMHG | SYSTOLIC BLOOD PRESSURE: 111 MMHG | HEIGHT: 64 IN | WEIGHT: 168 LBS | TEMPERATURE: 98 F | OXYGEN SATURATION: 100 % | BODY MASS INDEX: 28.68 KG/M2 | HEART RATE: 86 BPM | RESPIRATION RATE: 16 BRPM

## 2023-11-08 PROCEDURE — 0503F POSTPARTUM CARE VISIT: CPT | Performed by: NURSE PRACTITIONER

## 2023-11-08 RX ADMIN — HYDROCORTISONE 2.5% 1 APPLICATION: 25 CREAM TOPICAL at 08:45

## 2023-11-08 RX ADMIN — IBUPROFEN 600 MG: 600 TABLET, FILM COATED ORAL at 08:39

## 2023-11-08 RX ADMIN — DOCUSATE SODIUM 100 MG: 100 CAPSULE, LIQUID FILLED ORAL at 08:38

## 2023-11-08 RX ADMIN — CALCIUM CARBONATE (ANTACID) CHEW TAB 500 MG 1 TABLET: 500 CHEW TAB at 01:57

## 2023-11-08 RX ADMIN — BENZOCAINE 1 APPLICATION: 5.6 OINTMENT TOPICAL at 08:45

## 2023-11-08 RX ADMIN — ACETAMINOPHEN 650 MG: 325 TABLET ORAL at 11:55

## 2023-11-08 RX ADMIN — WITCH HAZEL: 500 SOLUTION RECTAL; TOPICAL at 08:45

## 2023-11-08 NOTE — LACTATION NOTE
11/08/23 1045   Maternal Information   Date of Referral 11/08/23   Person Making Referral lactation consultant  (follow up consult)   Maternal Reason for Referral   (attempted to breastfeed for 4 weeks with first baby--triple fed that entire time;  reports milk never came in and switched to bottles and formula.that baby is now 18 months old.)   Infant Reason for Referral   (reports baby fed constantly yesterday and last  night--always hungry and never satisfied. Gave formula the early morning hours. Plans to pump at home.)   Milk Expression/Equipment   Breast Pump Type double electric, personal  (has personal insurance spectra S2 to use at home. Knows how to use it and had QR code for instrucaional video for review.)   Breast Pumping   Breast Pumping Interventions post-feed pumping encouraged  (Discussed that there is a possibility that mom may not have a good milk supply with this bab6y. Discussed pumping every 3 hours for the next 10-14 days to get the best milk supply possible.)     If mom decides she wants to try to p ut to breast, if she gets milk , she can put baby to the breast 1-2 times per day. Or she can just pump and bottle feed. Discussed milk supply, pump use, fu with pediatrician. Encouraged to fu in lactation outpatient clinic, if milk comes in and mom wants to see how much baby gets at the breast. Can also call, if there are questions or concerns.

## 2023-11-08 NOTE — DISCHARGE SUMMARY
Discharge Summary    Date of Admission: 2023  Date of Discharge:  2023      Patient: Elma Lima      MR#:[id number]    Delivery Provider: Danya Murray Surgeon/OB: Lyudmila Larsen    Presenting Problem/History of Present Illness  Normal labor [O80, Z37.9]       Normal labor         Discharge Diagnosis: Vaginal delivery at 38w6d    Procedures:  Vaginal, Spontaneous     2023    11:15 AM        Discharge Date: 2023;     Hospital Course  Patient is a 32 y.o. female  at 38w6d status post vaginal delivery without complication. Postpartum the patient did well. She remained afebrile, with vital signs stable. She was ready for discharge on postpartum day 2.     MBT A negative. Infant RH negative. Rhogam is not indicated.    Infant:   female  fetus 3270 g (7 lb 3.3 oz)  with Apgar scores of 8 , 9  at five minutes.    Condition on Discharge:  Stable    Vital Signs  Temp:  [98 °F (36.7 °C)-98.5 °F (36.9 °C)] 98 °F (36.7 °C)  Heart Rate:  [78-92] 86  Resp:  [16] 16  BP: (111-124)/(65-77) 111/69    Lab Results   Component Value Date    WBC 10.65 2023    HGB 9.3 (L) 2023    HCT 27.9 (L) 2023    MCV 93.6 2023     2023       Discharge Disposition  Home or Self Care    Discharge Medications     Discharge Medications        Continue These Medications        Instructions Start Date   prenatal (CLASSIC) vitamin 28-0.8 MG tablet tablet  Generic drug: prenatal vitamin   Oral, Daily             Stop These Medications      calcium carbonate 500 MG chewable tablet  Commonly known as: TUMS     ferrous sulfate 15 mg/mL drops  Commonly known as: SUREKHA-IN-SOL     Pepcid 20 MG tablet  Generic drug: famotidine     valACYclovir 1000 MG tablet  Commonly known as: Valtrex     valACYclovir 500 MG tablet  Commonly known as: VALTREX            ASK your doctor about these medications        Instructions Start Date   aspirin 81 MG chewable tablet   81 mg, Oral, Daily                Discharge Diet:     Activity at Discharge:   Activity Instructions       Gradually Increase Activity Until at Pre-Hospitalization Level      Pelvic Rest      Sexual Activity Restrictions      Type of Restriction: Sex    Explain Sexual Activity Restrictions: Pelvic rest for 6 weeks    Work Restrictions      Type of Restriction: Work    May Return to Work: After Next Appointment    With / Without Restrictions: Without Restrictions            Follow-up Appointments  No future appointments.  Additional Instructions for the Follow-ups that You Need to Schedule       Call MD With Problems / Concerns   As directed      Instructions: Call for temp >/= 100.4, severe pain, severe bleeding, headache that does not improve with rest, medication, blurred vision, or postpartum depression. Monitor incision for signs of infection including, foul odor, discharge or separation of the wound.  Call for blood clots larger than a golf ball or saturating a pad in less than an hour.    Order Comments: Instructions: Call for temp >/= 100.4, severe pain, severe bleeding, headache that does not improve with rest, medication, blurred vision, or postpartum depression. Monitor incision for signs of infection including, foul odor, discharge or separation of the wound.  Call for blood clots larger than a golf ball or saturating a pad in less than an hour.         Discharge Follow-up with Specified Provider: FU in 6 weeks with Dr. Lim or an ZENON; 6 Weeks   As directed      To: FU in 6 weeks with Dr. Lim or an ZENON   Follow Up: 6 Weeks                ZENON Skelton  11/08/23  09:12 EST  [facility]

## 2023-11-08 NOTE — PLAN OF CARE
Goal Outcome Evaluation:  Plan of Care Reviewed With: patient        Progress: improving  Outcome Evaluation: ready for d/c

## 2023-12-20 ENCOUNTER — POSTPARTUM VISIT (OUTPATIENT)
Dept: OBSTETRICS AND GYNECOLOGY | Facility: CLINIC | Age: 33
End: 2023-12-20
Payer: COMMERCIAL

## 2023-12-20 VITALS
WEIGHT: 156.4 LBS | BODY MASS INDEX: 26.7 KG/M2 | HEIGHT: 64 IN | DIASTOLIC BLOOD PRESSURE: 80 MMHG | SYSTOLIC BLOOD PRESSURE: 120 MMHG

## 2023-12-20 DIAGNOSIS — O26.899 RH NEGATIVE, ANTEPARTUM: ICD-10-CM

## 2023-12-20 DIAGNOSIS — Z30.014 ENCOUNTER FOR INITIAL PRESCRIPTION OF INTRAUTERINE CONTRACEPTIVE DEVICE (IUD): ICD-10-CM

## 2023-12-20 DIAGNOSIS — Z67.91 RH NEGATIVE, ANTEPARTUM: ICD-10-CM

## 2023-12-20 DIAGNOSIS — Z87.59 HISTORY OF GESTATIONAL HYPERTENSION: Primary | ICD-10-CM

## 2023-12-20 PROBLEM — O98.519 HERPES SIMPLEX TYPE 2 (HSV-2) INFECTION AFFECTING PREGNANCY, ANTEPARTUM: Status: RESOLVED | Noted: 2021-10-01 | Resolved: 2023-12-20

## 2023-12-20 PROBLEM — B00.9 HERPES SIMPLEX TYPE 2 (HSV-2) INFECTION AFFECTING PREGNANCY, ANTEPARTUM: Status: RESOLVED | Noted: 2021-10-01 | Resolved: 2023-12-20

## 2023-12-20 PROBLEM — Z37.9 NORMAL LABOR: Status: RESOLVED | Noted: 2023-11-06 | Resolved: 2023-12-20

## 2023-12-20 PROBLEM — Z34.90 PREGNANCY: Status: RESOLVED | Noted: 2023-05-31 | Resolved: 2023-12-20

## 2023-12-20 NOTE — PROGRESS NOTES
"      Chief Complaint   Patient presents with    Postpartum Care       Postpartum  6 wks        Elma Lima is a 33 y.o.  who is s/p Vaginal, Spontaneous    Information for the patient's :  Isa Lima [[id number]]   2023   female   Isa Lima   3270 g (7 lb 3.3 oz)   Gestational Age: 38w6d    Baby Discharged: Discharged with Mom  Delivering Physician: Danya Lim MD    She presents today for 6 week postpartum follow up.       She had a laceration that has healed well.  Patient describes bleeding as absent.  Patient is bottle feeding.  She denies bowel issues but c/o stress incontinence. She has not had intercourse since delivery and desires another paragard IUDfor contraception.  Last pap was  and was negative with negative HPV testing.     Patient denies postpartum depression. Postpartum Depression Screening Questionnaire: 0, No  treatment indicated.    The additional following portions of the patient's history were reviewed and updated as appropriate: allergies, current medications, past family history, past medical history, past social history, past surgical history, and problem list.      Review of Systems    I have reviewed and agree with the HPI, ROS, and historical information as entered above. Danya Lim MD      Objective   /80   Ht 162.6 cm (64\")   Wt 70.9 kg (156 lb 6.4 oz)   LMP 2023   Breastfeeding No   BMI 26.85 kg/m²     Physical Exam  Vitals and nursing note reviewed. Exam conducted with a chaperone present.   Constitutional:       Appearance: She is well-developed.   HENT:      Head: Normocephalic and atraumatic.   Neck:      Thyroid: No thyroid mass or thyromegaly.   Pulmonary:      Breath sounds: No rhonchi.   Abdominal:      Palpations: Abdomen is soft. Abdomen is not rigid. There is no mass.      Tenderness: There is no abdominal tenderness. There is no guarding.      Hernia: No hernia is present.   Genitourinary:     Vagina: " Normal.      Cervix: Normal.      Uterus: Normal.    Musculoskeletal:      Cervical back: Normal range of motion. No muscular tenderness.   Neurological:      Mental Status: She is alert and oriented to person, place, and time.   Psychiatric:         Behavior: Behavior normal.              Assessment and Plan    Problem List Items Addressed This Visit       RESOLVED: History of gestational hypertension - Primary    Overview     Diagnosed in labor 38 wks  bASA at 12 w         RESOLVED: Rh negative, antepartum    Overview     Needs Rhogam 30 wk visit          Other Visit Diagnoses       Postpartum exam        Relevant Orders    Ambulatory Referral to Physical Therapy Pelvic Floor    Encounter for initial prescription of intrauterine contraceptive device (IUD)                S/p Vaginal delivery, 6 weeks postpartum.  Doing well.    Referral to pelvic floor PT  Contraception: contraceptive methods: paragard  Return in about 2 weeks (around 1/3/2024) for FU paragard. , IUD Insertion.    Danya Lim MD  12/20/2023   Answers submitted by the patient for this visit:  Other (Submitted on 12/20/2023)  Please describe your symptoms.: Postpartum follow up  Have you had these symptoms before?: Yes  How long have you been having these symptoms?: 1-4 days  Please list any medications you are currently taking for this condition.: None  Primary Reason for Visit (Submitted on 12/20/2023)  What is the primary reason for your visit?: Other

## 2024-11-06 ENCOUNTER — INITIAL PRENATAL (OUTPATIENT)
Dept: OBSTETRICS AND GYNECOLOGY | Facility: CLINIC | Age: 34
End: 2024-11-06
Payer: COMMERCIAL

## 2024-11-06 VITALS — BODY MASS INDEX: 29.42 KG/M2 | SYSTOLIC BLOOD PRESSURE: 120 MMHG | WEIGHT: 171.4 LBS | DIASTOLIC BLOOD PRESSURE: 80 MMHG

## 2024-11-06 DIAGNOSIS — Z86.19 HISTORY OF HERPES GENITALIS: ICD-10-CM

## 2024-11-06 DIAGNOSIS — Z67.91 RH NEGATIVE, ANTEPARTUM: ICD-10-CM

## 2024-11-06 DIAGNOSIS — O26.899 RH NEGATIVE, ANTEPARTUM: ICD-10-CM

## 2024-11-06 DIAGNOSIS — Z3A.09 9 WEEKS GESTATION OF PREGNANCY: ICD-10-CM

## 2024-11-06 DIAGNOSIS — Z87.59 HISTORY OF PREGNANCY INDUCED HYPERTENSION: ICD-10-CM

## 2024-11-06 DIAGNOSIS — Z34.81 MULTIGRAVIDA IN FIRST TRIMESTER: Primary | ICD-10-CM

## 2024-11-06 PROBLEM — Z34.90 PREGNANCY: Status: ACTIVE | Noted: 2024-11-06

## 2024-11-06 NOTE — PROGRESS NOTES
Initial ob visit     CC- Here for care of pregnancy        Elma Lima is a 33 y.o. female, , who presents for her first obstetrical visit.  Patient's last menstrual period was 2024.. Her STANISLAV is 2025, by Last Menstrual Period. Current GA is 9w6d.     Initial positive test date : 10/06/024, UPT        Her periods are every 28 days.  Prior obstetric issues: h/o GHTN  Patient's past medical history is significant for:  None .  Family history of genetic issues (includes FOB): None  Prior infections concerning in pregnancy (Rash, fever in last 2 weeks): No  Varicella Hx - history of chicken pox  Prior testing for Cystic Fibrosis Carrier or Sickle Cell Trait- No  Prepregnancy BMI - Body mass index is 29.42 kg/m².  History of STD: no  Hx of HSV for patient or partner: yes - MOB  Ultrasound Today: yes    OB History    Para Term  AB Living   3 2 2 0 0 2   SAB IAB Ectopic Molar Multiple Live Births   0 0 0 0 0 2      # Outcome Date GA Lbr Emeterio/2nd Weight Sex Type Anes PTL Lv   3 Current            2 Term 23 38w6d  3270 g (7 lb 3.3 oz) F Vag-Spont EPI N DAVON      Complications: Fetal Intolerance   1 Term 22 38w6d 08:09 / 02:11 3390 g (7 lb 7.6 oz) M Vag-Spont EPI N DAVON      Obstetric Comments   FOB #1 Pregnancy #1 & 2       Additional Pertinent History   Last Pap : 2022 Result: negative HPV: negative     Last Completed Pap Smear            PAP SMEAR (Every 3 Years) Next due on 2022  LIQUID-BASED PAP SMEAR, [facility] ([facility])                  History of abnormal Pap smear: no  Family history of uterine, colon, breast, or ovarian cancer: yes - MGM, PGM had breast cancer  Feelings of Anxiety or Depression: no  Tobacco Usage?: No   Alcohol/Drug Use?: NO  Over the age of 35 at delivery: No  Genetic Screening: desires cell free DNA  Flu Status: Declines    PMH    Current Outpatient Medications:     Prenatal Vit-Fe Fumarate-FA (PRENATAL VITAMIN PO),  Take  by mouth., Disp: , Rfl:      Past Medical History:   Diagnosis Date    HSV-1 (herpes simplex virus 1) infection     Migraine 2007    Pregnancy 2023    Rh incompatibility     Tuberculosis Dec 2013 positive for latent    Treatment completed at [facility] Infectious disease    Varicella Small child        Past Surgical History:   Procedure Laterality Date    LAPAROSCOPIC CHOLECYSTECTOMY  22    MANDIBLE FRACTURE SURGERY      WISDOM TOOTH EXTRACTION         Review of Systems   Review of Systems    Patient Reports:  having moderate nausea and vomiting   Patient Denies:excessive nausea , excessive vomiting, and vaginal bleeding  All systems reviewed and otherwise normal.    I have reviewed and agree with the HPI, ROS, and historical information as entered above. Danya Lim MD      /80   Wt 77.7 kg (171 lb 6.4 oz)   LMP 2024   BMI 29.42 kg/m²     The additional following portions of the patient's history were reviewed and updated as appropriate: allergies, current medications, past family history, past medical history, past social history, past surgical history, and problem list.    Physical Exam  General:  well developed; well nourished  no acute distress  mentation appropriate   Chest/Respiratory: No labored breathing, normal respiratory effort, normal appearance, no respiratory noises noted   Heart:  not examined   Thyroid: not examined   Breasts:  Not performed.   Abdomen: Not performed.   Pelvis: Not performed.        Assessment and Plan    Problem List Items Addressed This Visit       Pregnancy    Overview     2 prev  term  NIPT         History of pregnancy induced hypertension    Overview     Baby asa  With G1         Rh negative, antepartum    History of herpes genitalis    Overview     Suppression 36 wks          Other Visit Diagnoses       Multigravida in first trimester    -  Primary    Relevant Orders    Obstetric Panel    HIV-1 / O / 2 Ag / Antibody    Urine Culture -  Urine, Urine, Clean Catch    Urinalysis With Microscopic - Urine, Clean Catch    Chlamydia trachomatis, Neisseria gonorrhoeae, PCR - Urine, Urine, Clean Catch    Urine Drug Screen - Urine, Clean Catch    [id number] PLUS Core+SCA+ESS - Blood,            Pregnancy at 9w6d  Reviewed routine prenatal care with the office and educational materials given  Lab(s) Ordered  Discussed options for genetic testing including first trimester nuchal translucency screen, genetic disease carrier testing, quadruple screen, and NIPT  Patient is on Prenatal vitamins  Activity recommendation : 150 minutes/week of moderate intensity aerobic activity unless we limit for bleeding, hypertension or other pregnancy complication   discussed baby aspirin from 12 weeks to delivery for prevention of preeclampsia   Return in about 1 month (around 12/6/2024) for F/U Prenatal.      Danya Lim MD  11/06/2024

## 2024-11-07 LAB — MED LIST OPTION NOT SELECTED: NORMAL

## 2024-11-11 ENCOUNTER — TELEPHONE (OUTPATIENT)
Dept: OBSTETRICS AND GYNECOLOGY | Facility: CLINIC | Age: 34
End: 2024-11-11
Payer: COMMERCIAL

## 2024-11-11 LAB
5P15 DELETION (CRI-DU-CHAT): NOT DETECTED
ABO GROUP BLD: NORMAL
AMPHETAMINES UR QL SCN: NEGATIVE NG/ML
APPEARANCE UR: CLEAR
BACTERIA #/AREA URNS HPF: ABNORMAL /[HPF]
BACTERIA UR CULT: ABNORMAL
BACTERIA UR CULT: ABNORMAL
BARBITURATES UR QL SCN: NEGATIVE NG/ML
BASOPHILS # BLD AUTO: 0 X10E3/UL (ref 0–0.2)
BASOPHILS NFR BLD AUTO: 0 %
BENZODIAZ UR QL SCN: NEGATIVE NG/ML
BILIRUB UR QL STRIP: NEGATIVE
BLD GP AB SCN SERPL QL: NEGATIVE
BZE UR QL SCN: NEGATIVE NG/ML
C TRACH RRNA SPEC QL NAA+PROBE: NEGATIVE
CANNABINOIDS UR QL SCN: NEGATIVE NG/ML
CASTS URNS QL MICRO: ABNORMAL /LPF
CFDNA.FET/CFDNA.TOTAL SFR FETUS: NORMAL %
CITATION REF LAB TEST: NORMAL
COLOR UR: YELLOW
CREAT UR-MCNC: 152.3 MG/DL (ref 20–300)
EOSINOPHIL # BLD AUTO: 0.1 X10E3/UL (ref 0–0.4)
EOSINOPHIL NFR BLD AUTO: 1 %
EPI CELLS #/AREA URNS HPF: ABNORMAL /HPF (ref 0–10)
ERYTHROCYTE [DISTWIDTH] IN BLOOD BY AUTOMATED COUNT: 12.9 % (ref 11.7–15.4)
FET 13+18+21+X+Y ANEUP PLAS.CFDNA: NEGATIVE
FET 1P36 DEL RISK WBC.DNA+CFDNA QL: NOT DETECTED
FET 22Q11.2 DEL RISK WBC.DNA+CFDNA QL: NOT DETECTED
FET CHR 11Q23 DEL PLAS.CFDNA QL: NOT DETECTED
FET CHR 15Q11 DEL PLAS.CFDNA QL: NOT DETECTED
FET CHR 21 TS PLAS.CFDNA QL: NEGATIVE
FET CHR 4P16 DEL PLAS.CFDNA QL: NOT DETECTED
FET CHR 8Q24 DEL PLAS.CFDNA QL: NOT DETECTED
FET MS X RISK WBC.DNA+CFDNA QL: NOT DETECTED
FET SEX PLAS.CFDNA DOSAGE CFDNA: NORMAL
FET TS 13 RISK PLAS.CFDNA QL: NEGATIVE
FET TS 18 RISK WBC.DNA+CFDNA QL: NEGATIVE
FET X + Y ANEUP RISK PLAS.CFDNA SEQ-IMP: NOT DETECTED
GA EST FROM CONCEPTION DATE: NORMAL D
GESTATIONAL AGE > 9:: YES
GLUCOSE UR QL STRIP: NEGATIVE
HBV SURFACE AG SERPL QL IA: NEGATIVE
HCT VFR BLD AUTO: 39.7 % (ref 34–46.6)
HCV IGG SERPL QL IA: NON REACTIVE
HGB BLD-MCNC: 13.2 G/DL (ref 11.1–15.9)
HGB UR QL STRIP: NEGATIVE
HIV 1+2 AB+HIV1 P24 AG SERPL QL IA: NON REACTIVE
IMM GRANULOCYTES # BLD AUTO: 0 X10E3/UL (ref 0–0.1)
IMM GRANULOCYTES NFR BLD AUTO: 0 %
KETONES UR QL STRIP: NEGATIVE
LAB DIRECTOR NAME PROVIDER: NORMAL
LAB DIRECTOR NAME PROVIDER: NORMAL
LABORATORY COMMENT REPORT: NORMAL
LABORATORY COMMENT REPORT: NORMAL
LEUKOCYTE ESTERASE UR QL STRIP: ABNORMAL
LIMITATIONS OF THE TEST: NORMAL
LYMPHOCYTES # BLD AUTO: 1.6 X10E3/UL (ref 0.7–3.1)
LYMPHOCYTES NFR BLD AUTO: 18 %
MCH RBC QN AUTO: 30.7 PG (ref 26.6–33)
MCHC RBC AUTO-ENTMCNC: 33.2 G/DL (ref 31.5–35.7)
MCV RBC AUTO: 92 FL (ref 79–97)
METHADONE UR QL SCN: NEGATIVE NG/ML
MICRO URNS: ABNORMAL
MONOCYTES # BLD AUTO: 0.5 X10E3/UL (ref 0.1–0.9)
MONOCYTES NFR BLD AUTO: 5 %
N GONORRHOEA RRNA SPEC QL NAA+PROBE: NEGATIVE
NEGATIVE PREDICTIVE VALUE: NORMAL
NEUTROPHILS # BLD AUTO: 6.8 X10E3/UL (ref 1.4–7)
NEUTROPHILS NFR BLD AUTO: 76 %
NITRITE UR QL STRIP: NEGATIVE
NOTE: NORMAL
OPIATES UR QL SCN: NEGATIVE NG/ML
OTHER ANTIBIOTIC SUSC ISLT: ABNORMAL
OXYCODONE+OXYMORPHONE UR QL SCN: NEGATIVE NG/ML
PCP UR QL: NEGATIVE NG/ML
PERFORMANCE CHARACTERISTICS: NORMAL
PH UR STRIP: 6 [PH] (ref 5–7.5)
PH UR: 5.6 [PH] (ref 4.5–8.9)
PLATELET # BLD AUTO: 252 X10E3/UL (ref 150–450)
POSITIVE PREDICTIVE VALUE: NORMAL
PROPOXYPH UR QL SCN: NEGATIVE NG/ML
PROT UR QL STRIP: ABNORMAL
RBC # BLD AUTO: 4.3 X10E6/UL (ref 3.77–5.28)
RBC #/AREA URNS HPF: ABNORMAL /HPF (ref 0–2)
REF LAB TEST METHOD: NORMAL
RH BLD: NEGATIVE
RPR SER QL: NON REACTIVE
RUBV IGG SERPL IA-ACNC: 2.62 INDEX
SP GR UR STRIP: 1.02 (ref 1–1.03)
TEST PERFORMANCE INFO SPEC: NORMAL
TRIOSOMY 16: NOT DETECTED
TRISOMY 22: NOT DETECTED
UROBILINOGEN UR STRIP-MCNC: 0.2 MG/DL (ref 0.2–1)
WBC # BLD AUTO: 9 X10E3/UL (ref 3.4–10.8)
WBC #/AREA URNS HPF: ABNORMAL /HPF (ref 0–5)

## 2024-12-04 ENCOUNTER — ROUTINE PRENATAL (OUTPATIENT)
Dept: OBSTETRICS AND GYNECOLOGY | Facility: CLINIC | Age: 34
End: 2024-12-04
Payer: COMMERCIAL

## 2024-12-04 VITALS — WEIGHT: 172.8 LBS | SYSTOLIC BLOOD PRESSURE: 124 MMHG | DIASTOLIC BLOOD PRESSURE: 82 MMHG | BODY MASS INDEX: 29.66 KG/M2

## 2024-12-04 DIAGNOSIS — Z86.19 HISTORY OF HERPES GENITALIS: ICD-10-CM

## 2024-12-04 DIAGNOSIS — Z87.59 HISTORY OF PREGNANCY INDUCED HYPERTENSION: ICD-10-CM

## 2024-12-04 DIAGNOSIS — O26.899 RH NEGATIVE, ANTEPARTUM: ICD-10-CM

## 2024-12-04 DIAGNOSIS — Z67.91 RH NEGATIVE, ANTEPARTUM: ICD-10-CM

## 2024-12-04 DIAGNOSIS — Z3A.13 13 WEEKS GESTATION OF PREGNANCY: ICD-10-CM

## 2024-12-04 DIAGNOSIS — Z34.93 PRENATAL CARE IN THIRD TRIMESTER: Primary | ICD-10-CM

## 2024-12-04 LAB
GLUCOSE UR STRIP-MCNC: NEGATIVE MG/DL
PROT UR STRIP-MCNC: NEGATIVE MG/DL

## 2024-12-04 NOTE — PROGRESS NOTES
OB FOLLOW UP  CC- Here for care of pregnancy        Elma Lima is a 34 y.o.  13w6d patient being seen today for her obstetrical follow up visit. Patient reports no complaints.     Her prenatal care is complicated by (and status) :   Patient Active Problem List   Diagnosis    Pregnancy    History of pregnancy induced hypertension    Rh negative, antepartum    History of herpes genitalis       Genetic testing?: already completed and was normal.  NOB labs reviewed. UTI at NOB, LAURA sent today  Flu Status: Declines  Ultrasound Today: No    ROS -   Patient Denies: leaking of fluid, vaginal bleeding, dysuria, excessive vomiting, and more than 6 contractions per hour  All other systems reviewed and are negative.     The additional following portions of the patient's history were reviewed and updated as appropriate: allergies, current medications, past family history, past medical history, past social history, past surgical history, and problem list.    I have reviewed and agree with the HPI, ROS, and historical information as entered above. Danya Lim MD          /82   Wt 78.4 kg (172 lb 12.8 oz)   LMP 2024   BMI 29.66 kg/m²         EXAM:     Prenatal Vitals  BP: 124/82  Weight: 78.4 kg (172 lb 12.8 oz)   Fetal Heart Rate: +          Urine Glucose Read-only: Negative  Urine Protein Read-only: Negative       Assessment and Plan    Problem List Items Addressed This Visit       Pregnancy    Overview     2 prev  term  NIPT low risk         History of pregnancy induced hypertension    Overview     Baby asa  With G1         Rh negative, antepartum    History of herpes genitalis    Overview     Suppression 36 wks          Other Visit Diagnoses       Prenatal care in third trimester    -  Primary    Relevant Orders    POC Urinalysis Dipstick (Completed)    Urine Culture - Urine, Urine, Clean Catch            Pregnancy at 13w6d  Labs reviewed from New OB Visit.  Counseled on genetic  testing, carrier status and option for NT screen- done and low risk  Activity and Exercise discussed.  Patient is on Prenatal vitamins  Return in about 1 month (around 1/4/2025) for F/U Prenatal.    Danya Lim MD  12/04/2024

## 2025-01-08 ENCOUNTER — ROUTINE PRENATAL (OUTPATIENT)
Dept: OBSTETRICS AND GYNECOLOGY | Facility: CLINIC | Age: 35
End: 2025-01-08
Payer: COMMERCIAL

## 2025-01-08 VITALS — DIASTOLIC BLOOD PRESSURE: 80 MMHG | WEIGHT: 171.6 LBS | SYSTOLIC BLOOD PRESSURE: 122 MMHG | BODY MASS INDEX: 29.46 KG/M2

## 2025-01-08 DIAGNOSIS — Z86.19 HISTORY OF HERPES GENITALIS: ICD-10-CM

## 2025-01-08 DIAGNOSIS — O26.899 RH NEGATIVE, ANTEPARTUM: ICD-10-CM

## 2025-01-08 DIAGNOSIS — Z67.91 RH NEGATIVE, ANTEPARTUM: ICD-10-CM

## 2025-01-08 DIAGNOSIS — Z34.93 PRENATAL CARE IN THIRD TRIMESTER: Primary | ICD-10-CM

## 2025-01-08 DIAGNOSIS — Z87.59 HISTORY OF PREGNANCY INDUCED HYPERTENSION: ICD-10-CM

## 2025-01-08 DIAGNOSIS — Z3A.18 18 WEEKS GESTATION OF PREGNANCY: ICD-10-CM

## 2025-01-08 LAB
GLUCOSE UR STRIP-MCNC: NEGATIVE MG/DL
PROT UR STRIP-MCNC: NEGATIVE MG/DL

## 2025-01-08 NOTE — PROGRESS NOTES
OB FOLLOW UP  CC- Here for care of pregnancy        Elma Lima is a 34 y.o.  18w6d patient being seen today for her obstetrical follow up visit. Patient reports no complaints    Her prenatal care is complicated by (and status) :   Patient Active Problem List   Diagnosis    Pregnancy    History of pregnancy induced hypertension    Rh negative, antepartum    History of herpes genitalis       Flu Status: Declines  Ultrasound Today: No    AFP: declines    ROS -   Patient Denies: leaking of fluid, vaginal bleeding, dysuria, excessive vomiting, and more than 6 contractions per hour  All other systems reviewed and are negative.       The additional following portions of the patient's history were reviewed and updated as appropriate: allergies, current medications, past family history, past medical history, past social history, past surgical history, and problem list.      I have reviewed and agree with the HPI, ROS, and historical information as entered above. Danya Lim MD          EXAM:     Prenatal Vitals  BP: 122/80  Weight: 77.8 kg (171 lb 9.6 oz)   Fetal Heart Rate: +         Urine Glucose Read-only: Negative  Urine Protein Read-only: Negative           Assessment and Plan    Problem List Items Addressed This Visit       Pregnancy    Overview     2 prev  term  NIPT low risk         History of pregnancy induced hypertension    Overview     Baby asa  With G1         Relevant Orders    US Ob 14 + Weeks Single or First Gestation    Rh negative, antepartum    Relevant Orders    US Ob 14 + Weeks Single or First Gestation    History of herpes genitalis    Overview     Suppression 36 wks          Other Visit Diagnoses       Prenatal care in third trimester    -  Primary    Relevant Orders    POC Urinalysis Dipstick (Completed)    Urine Culture - Urine, Urine, Clean Catch            Pregnancy at 18w6d  Fetal status reassuring.   Counseled on MSAFP alone in relation to OTD and placental issues.   Declines  Urine LAURA today  Anatomy scan next visit.   Activity and Exercise discussed.  Patient is on Prenatal vitamins  Return in about 2 weeks (around 1/22/2025) for F/U Prenatal, U/S Next Visit.    Danya Lim MD  01/08/2025

## 2025-01-10 LAB
BACTERIA UR CULT: NO GROWTH
BACTERIA UR CULT: NORMAL

## 2025-01-22 ENCOUNTER — ROUTINE PRENATAL (OUTPATIENT)
Dept: OBSTETRICS AND GYNECOLOGY | Facility: CLINIC | Age: 35
End: 2025-01-22
Payer: COMMERCIAL

## 2025-01-22 VITALS — WEIGHT: 173 LBS | BODY MASS INDEX: 29.7 KG/M2 | SYSTOLIC BLOOD PRESSURE: 122 MMHG | DIASTOLIC BLOOD PRESSURE: 80 MMHG

## 2025-01-22 DIAGNOSIS — Z3A.20 20 WEEKS GESTATION OF PREGNANCY: Primary | ICD-10-CM

## 2025-01-22 LAB
GLUCOSE UR STRIP-MCNC: NEGATIVE MG/DL
PROT UR STRIP-MCNC: NEGATIVE MG/DL

## 2025-01-22 NOTE — PROGRESS NOTES
OB FOLLOW UP  CC- Here for care of pregnancy        Elma Lima is a 34 y.o.  20w6d patient being seen today for her obstetrical follow up visit. Patient reports no complaints.     Her prenatal care is complicated by (and status) : see below.  Patient Active Problem List   Diagnosis    Pregnancy    History of pregnancy induced hypertension    Rh negative, antepartum    History of herpes genitalis       Flu Status: Declines  Ultrasound Today: Yes    ROS -     Patient Denies: leaking of fluid, vaginal bleeding, dysuria, excessive vomiting, and more than 6 contractions per hour  Fetal Movement : Yes  All other systems reviewed and are negative.       The additional following portions of the patient's history were reviewed and updated as appropriate: allergies and current medications.      I have reviewed and agree with the HPI, ROS, and historical information as entered above. Danya Lim MD      /80   Wt 78.5 kg (173 lb)   LMP 2024   BMI 29.70 kg/m²       EXAM:     Prenatal Vitals  BP: 122/80  Weight: 78.5 kg (173 lb)   Fetal Heart Rate: US          Urine Glucose Read-only: Negative  Urine Protein Read-only: Negative       Assessment and Plan    Problem List Items Addressed This Visit       Pregnancy - Primary    Overview     2 prev  term  NIPT low risk         Relevant Orders    POC Urinalysis Dipstick (Completed)       Pregnancy at 20w6d  Anatomy scan today is complete and appear within normal limits.  Fetal status reassuring.   Activity and Exercise discussed.  Patient is on Prenatal vitamins  Return in about 1 month (around 2025) for F/U Prenatal.      Danya Lim MD  2025

## 2025-02-04 ENCOUNTER — TELEPHONE (OUTPATIENT)
Dept: OBSTETRICS AND GYNECOLOGY | Facility: CLINIC | Age: 35
End: 2025-02-04
Payer: COMMERCIAL

## 2025-02-19 ENCOUNTER — ROUTINE PRENATAL (OUTPATIENT)
Dept: OBSTETRICS AND GYNECOLOGY | Facility: CLINIC | Age: 35
End: 2025-02-19
Payer: COMMERCIAL

## 2025-02-19 VITALS — WEIGHT: 176 LBS | BODY MASS INDEX: 30.21 KG/M2 | DIASTOLIC BLOOD PRESSURE: 80 MMHG | SYSTOLIC BLOOD PRESSURE: 110 MMHG

## 2025-02-19 DIAGNOSIS — Z86.19 HISTORY OF HERPES GENITALIS: ICD-10-CM

## 2025-02-19 DIAGNOSIS — Z67.91 RH NEGATIVE, ANTEPARTUM: ICD-10-CM

## 2025-02-19 DIAGNOSIS — Z87.59 HISTORY OF PREGNANCY INDUCED HYPERTENSION: ICD-10-CM

## 2025-02-19 DIAGNOSIS — Z3A.24 24 WEEKS GESTATION OF PREGNANCY: Primary | ICD-10-CM

## 2025-02-19 DIAGNOSIS — O26.899 RH NEGATIVE, ANTEPARTUM: ICD-10-CM

## 2025-02-19 LAB
GLUCOSE UR STRIP-MCNC: NEGATIVE MG/DL
PROT UR STRIP-MCNC: NEGATIVE MG/DL

## 2025-02-19 NOTE — PROGRESS NOTES
OB FOLLOW UP  CC- Here for care of pregnancy        Elma Lima is a 34 y.o.  24w6d patient being seen today for her obstetrical follow up visit. Patient reports no complaints.     Her prenatal care is complicated by (and status) : see below.  Patient Active Problem List   Diagnosis    Pregnancy    History of pregnancy induced hypertension    Rh negative, antepartum    History of herpes genitalis       Flu Status: Declines  Ultrasound Today: No  Reviewed 1 hr glucose testing and TDAP next visit. Rhogam      ROS -   Patient Denies: leaking of fluid, vaginal bleeding, dysuria, excessive vomiting, and more than 6 contractions per hour  Fetal Movement : normal  Other than what is documented in the HPI, all other systems reviewed and are negative.       The additional following portions of the patient's history were reviewed and updated as appropriate: allergies and current medications.      I have reviewed and agree with the HPI, ROS, and historical information as entered above. Danya Lim MD      /80   Wt 79.8 kg (176 lb)   LMP 2024   BMI 30.21 kg/m²       EXAM:     Prenatal Vitals  BP: 110/80  Weight: 79.8 kg (176 lb)   Fetal Heart Rate: +      Fundal Height (cm): 24 cm        Urine Glucose Read-only: Negative  Urine Protein Read-only: Negative       Assessment and Plan    Problem List Items Addressed This Visit       Pregnancy - Primary    Overview     2 prev  term  NIPT low risk         Relevant Orders    POC Urinalysis Dipstick (Completed)    History of pregnancy induced hypertension    Overview     Baby asa  With G1         Rh negative, antepartum    History of herpes genitalis    Overview     Suppression 36 wks            Pregnancy at 24w6d  Fetal status reassuring.  No US indicated today.  1 hour gtt, CBC, Antibody screen, TDAP, RPR, and Rhogam  next visit. Instructions given  Discussed/encouraged TDAP vaccination after 28 weeks  Activity and Exercise discussed.  Return in  about 1 month (around 3/19/2025) for F/U Prenatal, and glucola.      Danya Lim MD  02/19/2025

## 2025-03-26 ENCOUNTER — ROUTINE PRENATAL (OUTPATIENT)
Dept: OBSTETRICS AND GYNECOLOGY | Facility: CLINIC | Age: 35
End: 2025-03-26
Payer: COMMERCIAL

## 2025-03-26 VITALS — WEIGHT: 176 LBS | BODY MASS INDEX: 30.21 KG/M2 | DIASTOLIC BLOOD PRESSURE: 78 MMHG | SYSTOLIC BLOOD PRESSURE: 112 MMHG

## 2025-03-26 DIAGNOSIS — Z13.1 SCREENING FOR DIABETES MELLITUS: ICD-10-CM

## 2025-03-26 DIAGNOSIS — O26.899 RH NEGATIVE, ANTEPARTUM: ICD-10-CM

## 2025-03-26 DIAGNOSIS — Z87.59 HISTORY OF PREGNANCY INDUCED HYPERTENSION: ICD-10-CM

## 2025-03-26 DIAGNOSIS — Z29.13 NEED FOR RHOGAM DUE TO RH NEGATIVE MOTHER: ICD-10-CM

## 2025-03-26 DIAGNOSIS — Z86.19 HISTORY OF HERPES GENITALIS: ICD-10-CM

## 2025-03-26 DIAGNOSIS — Z3A.29 29 WEEKS GESTATION OF PREGNANCY: Primary | ICD-10-CM

## 2025-03-26 DIAGNOSIS — Z67.91 RH NEGATIVE, ANTEPARTUM: ICD-10-CM

## 2025-03-26 DIAGNOSIS — Z23 IMMUNIZATION DUE: ICD-10-CM

## 2025-03-26 LAB
GLUCOSE UR STRIP-MCNC: NEGATIVE MG/DL
PROT UR STRIP-MCNC: NEGATIVE MG/DL

## 2025-03-26 NOTE — PROGRESS NOTES
OB FOLLOW UP  CC- Here for care of pregnancy        Elma Lima is a 34 y.o.  29w6d patient being seen today for her obstetrical follow up. Patient reports no complaints.     Patient undergoing Glucola testing today. She is due for her testing at 224.       MBT: A-  Rhogam: will be given today.  28 week packet: reviewed with patient , counseled on fetal movement , and breast pump discussed  TDAP: given today  Flu Status: Declines  Ultrasound Today: No    Her prenatal care is complicated by (and status) : see below.  Patient Active Problem List   Diagnosis    Pregnancy    History of pregnancy induced hypertension    Rh negative, antepartum    History of herpes genitalis         ROS -   Patient Denies: Loss of Fluid, Vaginal Spotting, Vision Changes, Headaches, Nausea , Vomiting , Contractions, Epigastric pain, and skin itching  Fetal Movement : normal  Other than what is documented in the HPI, all other systems reviewed and are negative.     The additional following portions of the patient's history were reviewed and updated as appropriate: allergies, current medications, past family history, past medical history, past social history, past surgical history, and problem list.    I have reviewed and agree with the HPI, ROS, and historical information as entered above. Danya Lim MD      /78   Wt 79.8 kg (176 lb)   LMP 2024   BMI 30.21 kg/m²         EXAM:     Prenatal Vitals  BP: 112/78  Weight: 79.8 kg (176 lb)   Fetal Heart Rate: +      Fundal Height (cm): 29 cm        Urine Glucose Read-only: Negative  Urine Protein Read-only: Negative         Assessment and Plan    Problem List Items Addressed This Visit       Pregnancy - Primary    Overview   2 prev  term  NIPT low risk         Relevant Orders    POC Urinalysis Dipstick (Completed)    CBC (No Diff)    Gestational Screen 1 Hr ([facility])    Antibody Screen    RPR, Rfx Qn RPR / Confirm TP    History of pregnancy induced  hypertension    Overview   Baby asa  With G1         Relevant Orders    US Ob Follow Up Transabdominal Approach    Rh negative, antepartum    History of herpes genitalis    Overview   Suppression 36 wks          Other Visit Diagnoses         Screening for diabetes mellitus        Relevant Orders    CBC (No Diff)    Gestational Screen 1 Hr ([facility])    Antibody Screen    RPR, Rfx Qn RPR / Confirm TP      Immunization due        Relevant Orders    Tdap Vaccine => 6yo IM (BOOSTRIX/ADACEL) (Completed)      Need for rhogam due to Rh negative mother        Relevant Orders    Rhogam Immune Globulin Immunization            Pregnancy at 29w6d  1 hr Glucola, CBC, RPR. Antibody screen, TDAP today, and Rhogam today  Fetal movement/PTL or Labor precautions  U/S ordered at follow up  Activity and Exercise discussed.  Return in about 3 weeks (around 4/16/2025) for F/U Prenatal, U/S Next Visit.        Danya Lim MD  03/26/2025

## 2025-03-27 LAB
BLD GP AB SCN SERPL QL: NEGATIVE
ERYTHROCYTE [DISTWIDTH] IN BLOOD BY AUTOMATED COUNT: 12.6 % (ref 11.7–15.4)
GLUCOSE 1H P 50 G GLC PO SERPL-MCNC: 109 MG/DL (ref 70–139)
HCT VFR BLD AUTO: 31.3 % (ref 34–46.6)
HGB BLD-MCNC: 10.4 G/DL (ref 11.1–15.9)
MCH RBC QN AUTO: 29.7 PG (ref 26.6–33)
MCHC RBC AUTO-ENTMCNC: 33.2 G/DL (ref 31.5–35.7)
MCV RBC AUTO: 89 FL (ref 79–97)
PLATELET # BLD AUTO: 226 X10E3/UL (ref 150–450)
RBC # BLD AUTO: 3.5 X10E6/UL (ref 3.77–5.28)
RPR SER QL: NON REACTIVE
WBC # BLD AUTO: 11.4 X10E3/UL (ref 3.4–10.8)

## 2025-04-17 ENCOUNTER — ROUTINE PRENATAL (OUTPATIENT)
Dept: OBSTETRICS AND GYNECOLOGY | Facility: CLINIC | Age: 35
End: 2025-04-17
Payer: COMMERCIAL

## 2025-04-17 VITALS — DIASTOLIC BLOOD PRESSURE: 78 MMHG | BODY MASS INDEX: 29.94 KG/M2 | SYSTOLIC BLOOD PRESSURE: 122 MMHG | WEIGHT: 174.4 LBS

## 2025-04-17 DIAGNOSIS — O26.899 RH NEGATIVE, ANTEPARTUM: Primary | ICD-10-CM

## 2025-04-17 DIAGNOSIS — Z86.19 HISTORY OF HERPES GENITALIS: ICD-10-CM

## 2025-04-17 DIAGNOSIS — Z34.93 PRENATAL CARE IN THIRD TRIMESTER, UNSPECIFIED GRAVIDITY: ICD-10-CM

## 2025-04-17 DIAGNOSIS — Z87.59 HISTORY OF PREGNANCY INDUCED HYPERTENSION: ICD-10-CM

## 2025-04-17 DIAGNOSIS — Z67.91 RH NEGATIVE, ANTEPARTUM: Primary | ICD-10-CM

## 2025-04-17 DIAGNOSIS — Z3A.33 33 WEEKS GESTATION OF PREGNANCY: ICD-10-CM

## 2025-04-17 LAB
GLUCOSE UR STRIP-MCNC: NEGATIVE MG/DL
PROT UR STRIP-MCNC: NEGATIVE MG/DL

## 2025-04-17 RX ORDER — FERROUS SULFATE 325(65) MG
325 TABLET ORAL
COMMUNITY

## 2025-04-17 NOTE — PROGRESS NOTES
OB FOLLOW UP  CC- Here for care of pregnancy        Elma Lima is a 34 y.o.  33w0d patient being seen today for her obstetrical follow up visit. Patient reports one episode of lightheadedness, feeling flushed, and vision changes around two weeks ago. She called on call provider and symptoms improved after rest/hydration.  She is currently taking an iron supplement OTC for anemia.     Her prenatal care is complicated by (and status) :  see below.  Patient Active Problem List   Diagnosis    Pregnancy    History of pregnancy induced hypertension    Rh negative, antepartum    History of herpes genitalis     TDAP status: received at last visit  Rhogam status: was given  28 week labs: Reviewed and normal  Ultrasound Today: Yes  Non Stress Test: No.      ROS -   Patient Denies: Loss of Fluid, Vaginal Spotting, Vision Changes, Headaches, Contractions, Epigastric pain, and skin itching  Fetal Movement : normal  Other than what is documented in the HPI, all other systems reviewed and are negative.     The additional following portions of the patient's history were reviewed and updated as appropriate: allergies, current medications, past family history, past medical history, past social history, past surgical history, and problem list.    I have reviewed and agree with the HPI, ROS, and historical information as entered above. Danya Lim MD      /78   Wt 79.1 kg (174 lb 6.4 oz)   LMP 2024   BMI 29.94 kg/m²         EXAM:     Prenatal Vitals  BP: 122/78  Weight: 79.1 kg (174 lb 6.4 oz)   Fetal Heart Rate: 161               Urine Glucose Read-only: Negative  Urine Protein Read-only: Negative           Assessment and Plan    Problem List Items Addressed This Visit       Pregnancy    Overview   2 prev  term  NIPT low risk  EFW 33 wks 65%ile, AC 96%ile.          History of pregnancy induced hypertension    Overview   Baby asa  With G1         Rh negative, antepartum - Primary    History of  herpes genitalis    Overview   Suppression 36 wks          Other Visit Diagnoses         Prenatal care in third trimester, unspecified         Relevant Orders    POC Urinalysis Dipstick (Completed)            Pregnancy at 33w0d. US today normal growth and fluid. AC 96%ile. BPP . Her glucola was normal.   Iron for anemia.   Fetal status reassuring.  28 week labs reviewed.    Activity and Exercise discussed.  Fetal movement/PTL or Labor precautions  Return in about 2 weeks (around 2025) for F/U Prenatal.    Danya Lim MD  2025

## 2025-04-30 ENCOUNTER — ROUTINE PRENATAL (OUTPATIENT)
Dept: OBSTETRICS AND GYNECOLOGY | Facility: CLINIC | Age: 35
End: 2025-04-30
Payer: COMMERCIAL

## 2025-04-30 VITALS — DIASTOLIC BLOOD PRESSURE: 68 MMHG | SYSTOLIC BLOOD PRESSURE: 118 MMHG | BODY MASS INDEX: 30.04 KG/M2 | WEIGHT: 175 LBS

## 2025-04-30 DIAGNOSIS — Z87.59 HISTORY OF PREGNANCY INDUCED HYPERTENSION: ICD-10-CM

## 2025-04-30 DIAGNOSIS — Z86.19 HISTORY OF HERPES GENITALIS: ICD-10-CM

## 2025-04-30 DIAGNOSIS — Z67.91 RH NEGATIVE, ANTEPARTUM: ICD-10-CM

## 2025-04-30 DIAGNOSIS — O26.899 RH NEGATIVE, ANTEPARTUM: ICD-10-CM

## 2025-04-30 DIAGNOSIS — Z3A.34 34 WEEKS GESTATION OF PREGNANCY: Primary | ICD-10-CM

## 2025-04-30 LAB
GLUCOSE UR STRIP-MCNC: NEGATIVE MG/DL
PROT UR STRIP-MCNC: NEGATIVE MG/DL

## 2025-04-30 RX ORDER — VALACYCLOVIR HYDROCHLORIDE 500 MG/1
500 TABLET, FILM COATED ORAL 2 TIMES DAILY
Qty: 60 TABLET | Refills: 1 | Status: SHIPPED | OUTPATIENT
Start: 2025-04-30 | End: 2025-05-30

## 2025-04-30 NOTE — PROGRESS NOTES
OB FOLLOW UP  CC- Here for care of pregnancy        Elma Lima is a 34 y.o.  34w6d patient being seen today for her obstetrical follow up visit. Patient reports hurt back over the weekend and has had some swelling in ankles.     Her prenatal care is complicated by (and status) : see below.  Patient Active Problem List   Diagnosis    Pregnancy    History of pregnancy induced hypertension    Rh negative, antepartum    History of herpes genitalis       Flu Status: Declines  Ultrasound Today: No  Non Stress Test: No.    ROS -   Patient Denies: Loss of Fluid, Vaginal Spotting, Vision Changes, Headaches, Nausea , Vomiting , Epigastric pain, and skin itching  Fetal Movement : normal  Other than what is documented in the HPI, all other systems reviewed and are negative.       The additional following portions of the patient's history were reviewed and updated as appropriate: allergies and current medications.    I have reviewed and agree with the HPI, ROS, and historical information as entered above. Danya Lim MD      /68   Wt 79.4 kg (175 lb)   LMP 2024   BMI 30.04 kg/m²       EXAM:     Prenatal Vitals  BP: 118/68  Weight: 79.4 kg (175 lb)   Fetal Heart Rate: +               Urine Glucose Read-only: Negative  Urine Protein Read-only: Negative           Assessment and Plan    Problem List Items Addressed This Visit       Pregnancy - Primary    Overview   2 prev  term  NIPT low risk  EFW 33 wks 65%ile, AC 96%ile.          Relevant Orders    POC Urinalysis Dipstick (Completed)    History of pregnancy induced hypertension    Overview   Baby asa  With G1         Rh negative, antepartum    History of herpes genitalis    Overview   Suppression 36 wks            Pregnancy at 34w6d. Desires to sched 40 wk induction. Start hsv supp 36 wks.   Fetal status reassuring.   Activity and Exercise discussed.  Fetal movement/PTL or Labor precautions  GBS next visit  Return in about 2 weeks (around  5/14/2025) for F/U Prenatal.    Danya Lim MD  04/30/2025

## 2025-05-05 ENCOUNTER — TELEPHONE (OUTPATIENT)
Dept: OBSTETRICS AND GYNECOLOGY | Facility: CLINIC | Age: 35
End: 2025-05-05
Payer: COMMERCIAL

## 2025-05-05 NOTE — TELEPHONE ENCOUNTER
(Key: [id number])  Rx #: [id number] Drug  valACYclovir HCl 500MG tablets Form  [facility] [place]. Pending    Update: Outcome  Approved today by [facility] 2017  CaseId:[id number];Status:Approved;Review Type:Qty;Coverage Start Date:04/05/2025;Coverage End Date:05/05/2026;  Effective Date: 4/5/2025  Authorization Expiration Date: 5/5/2026

## 2025-05-14 ENCOUNTER — ROUTINE PRENATAL (OUTPATIENT)
Dept: OBSTETRICS AND GYNECOLOGY | Facility: CLINIC | Age: 35
End: 2025-05-14
Payer: COMMERCIAL

## 2025-05-14 VITALS — BODY MASS INDEX: 30.31 KG/M2 | WEIGHT: 176.6 LBS | DIASTOLIC BLOOD PRESSURE: 82 MMHG | SYSTOLIC BLOOD PRESSURE: 124 MMHG

## 2025-05-14 DIAGNOSIS — Z87.59 HISTORY OF PREGNANCY INDUCED HYPERTENSION: ICD-10-CM

## 2025-05-14 DIAGNOSIS — Z3A.36 36 WEEKS GESTATION OF PREGNANCY: ICD-10-CM

## 2025-05-14 DIAGNOSIS — Z67.91 RH NEGATIVE, ANTEPARTUM: ICD-10-CM

## 2025-05-14 DIAGNOSIS — Z86.19 HISTORY OF HERPES GENITALIS: ICD-10-CM

## 2025-05-14 DIAGNOSIS — O26.899 RH NEGATIVE, ANTEPARTUM: ICD-10-CM

## 2025-05-14 DIAGNOSIS — Z34.83 PRENATAL CARE, SUBSEQUENT PREGNANCY IN THIRD TRIMESTER: Primary | ICD-10-CM

## 2025-05-14 LAB
EXPIRATION DATE: NORMAL
GLUCOSE UR STRIP-MCNC: NEGATIVE MG/DL
Lab: NORMAL
PROT UR STRIP-MCNC: NEGATIVE MG/DL

## 2025-05-14 NOTE — PROGRESS NOTES
OB FOLLOW UP  CC- Here for care of pregnancy        Elma Lima is a 34 y.o.  36w6d patient being seen today for her obstetrical follow up visit. Patient reports swelling in her BLE and irregular joy reynoso/contractions.    She denies LOF, vaginal spotting, headaches, vision changes. She reports adequate fetal movements, >10 movements in 10 hours.        Her prenatal care is complicated by (and status) :   Patient Active Problem List   Diagnosis    Pregnancy    History of pregnancy induced hypertension    Rh negative, antepartum    History of herpes genitalis       GBS Status: Done Today. She is not allergic to PCN.    Allergies   Allergen Reactions    Ceclor [Cefaclor] Rash        Her Delivery Plan is:  IOL scheduled 25      today: no  Non Stress Test: No.    ROS -   Patient Denies: Loss of Fluid, Vaginal Spotting, Vision Changes, Headaches, Nausea , Vomiting , Epigastric pain, and skin itching  Fetal Movement : normal  Other than what is documented in the HPI, all other systems reviewed and are negative.       The additional following portions of the patient's history were reviewed and updated as appropriate: allergies, current medications, past family history, past medical history, past social history, past surgical history, and problem list.    I have reviewed and agree with the HPI, ROS, and historical information as entered above. Danya Lim MD        EXAM:     Prenatal Vitals  BP: 124/82  Weight: 80.1 kg (176 lb 9.6 oz)   Fetal Heart Rate: +       Dilation/Effacement/Station  Dilation: 1  Effacement (%): 70      Urine Glucose Read-only: Negative  Urine Protein Read-only: Negative           Assessment and Plan    Problem List Items Addressed This Visit       Pregnancy    Overview   2 prev  term  NIPT low risk  EFW 33 wks 65%ile, AC 96%ile.          History of pregnancy induced hypertension    Overview   Baby asa  With G1         Rh negative, antepartum    History of herpes  genitalis    Overview   Suppression 36 wks          Other Visit Diagnoses         Prenatal care, subsequent pregnancy in third trimester    -  Primary    Relevant Orders    POC Glucose, Urine, Qualitative, Dipstick (Completed)    POC Protein, Urine, Qualitative, Dipstick (Completed)    Group B Streptococcus Culture - Swab, Vaginal/Rectum            Pregnancy at 36w6d  Fetal status reassuring.   Reviewed Pre-eclampsia signs/symptoms  Delivery options reviewed with patient  Signs of labor reviewed  Kick counts reviewed  Activity and Exercise discussed.  Return in about 1 week (around 5/21/2025) for F/U Prenatal.    Danya Lim MD  05/14/2025

## 2025-05-18 LAB — B-HEM STREP SPEC QL CULT: POSITIVE

## 2025-05-21 ENCOUNTER — ROUTINE PRENATAL (OUTPATIENT)
Dept: OBSTETRICS AND GYNECOLOGY | Facility: CLINIC | Age: 35
End: 2025-05-21
Payer: COMMERCIAL

## 2025-05-21 VITALS — WEIGHT: 178 LBS | SYSTOLIC BLOOD PRESSURE: 128 MMHG | BODY MASS INDEX: 30.55 KG/M2 | DIASTOLIC BLOOD PRESSURE: 82 MMHG

## 2025-05-21 DIAGNOSIS — Z87.59 HISTORY OF PREGNANCY INDUCED HYPERTENSION: ICD-10-CM

## 2025-05-21 DIAGNOSIS — Z3A.37 37 WEEKS GESTATION OF PREGNANCY: Primary | ICD-10-CM

## 2025-05-21 DIAGNOSIS — O26.899 RH NEGATIVE, ANTEPARTUM: ICD-10-CM

## 2025-05-21 DIAGNOSIS — Z67.91 RH NEGATIVE, ANTEPARTUM: ICD-10-CM

## 2025-05-21 DIAGNOSIS — Z86.19 HISTORY OF HERPES GENITALIS: ICD-10-CM

## 2025-05-21 LAB
GLUCOSE UR STRIP-MCNC: NEGATIVE MG/DL
PROT UR STRIP-MCNC: NEGATIVE MG/DL

## 2025-05-21 NOTE — PROGRESS NOTES
OB FOLLOW UP  CC- Here for care of pregnancy        Elma Lima is a 34 y.o.  37w6d patient being seen today for her obstetrical follow up visit. Patient reports no complaints.     Her prenatal care is complicated by (and status) : see below.  Patient Active Problem List   Diagnosis    Pregnancy    History of pregnancy induced hypertension    Rh negative, antepartum    History of herpes genitalis       GBS Status:   Strep Gp B BRYN   Date Value Ref Range Status   2022 Positive (A) Negative Final     Comment:     Centers for Disease Control and Prevention (CDC) and American Congress  of Obstetricians and Gynecologists (ACOG) guidelines for prevention of   group B streptococcal (GBS) disease specify co-collection of  a vaginal and rectal swab specimen to maximize sensitivity of GBS  detection. Per the CDC and ACOG, swabbing both the lower vagina and  rectum substantially increases the yield of detection compared with  sampling the vagina alone.  Penicillin G, ampicillin, or cefazolin are indicated for intrapartum  prophylaxis of  GBS colonization. Reflex susceptibility  testing should be performed prior to use of clindamycin only on GBS  isolates from penicillin-allergic women who are considered a high risk  for anaphylaxis. Treatment with vancomycin without additional testing  is warranted if resistance to clindamycin is noted.       Strep Gp B Culture   Date Value Ref Range Status   2025 Positive (A) Negative Final     Comment:     Centers for Disease Control and Prevention (CDC) and American Congress  of Obstetricians and Gynecologists (ACOG) guidelines for prevention of   group B streptococcal (GBS) disease specify co-collection of  a vaginal and rectal swab specimen to maximize sensitivity of GBS  detection. Per the CDC and ACOG, swabbing both the lower vagina and  rectum substantially increases the yield of detection compared with  sampling the vagina  alone.  Penicillin G, ampicillin, or cefazolin are indicated for intrapartum  prophylaxis of  GBS colonization. Reflex susceptibility  testing should be performed prior to use of clindamycin only on GBS  isolates from penicillin-allergic women who are considered a high risk  for anaphylaxis. Treatment with vancomycin without additional testing  is warranted if resistance to clindamycin is noted.           Allergies   Allergen Reactions    Ceclor [Cefaclor] Rash          Flu Status: Declines  Her Delivery Plan is: Desires IOL at 39wks. Scheduled    US today: no  Non Stress Test: Non Stress Test: minutes 20  non-stress test: NST: Reactive  indication: Hypertension      ROS -   Patient Denies: Loss of Fluid, Vaginal Spotting, Vision Changes, Headaches, Nausea , Vomiting , Epigastric pain, and skin itching  Fetal Movement : normal  Other than what is documented in the HPI, all other systems reviewed and are negative.       The additional following portions of the patient's history were reviewed and updated as appropriate: allergies and current medications.    I have reviewed and agree with the HPI, ROS, and historical information as entered above. Danya Lim MD        EXAM:     Prenatal Vitals  BP: 128/82  Weight: 80.7 kg (178 lb)   Fetal Heart Rate: NST       Dilation/Effacement/Station  Dilation: 2  Effacement (%): 70      Urine Glucose Read-only: Negative  Urine Protein Read-only: Negative           Assessment and Plan    Problem List Items Addressed This Visit       Pregnancy - Primary    Overview   2 prev  term  NIPT low risk  EFW 33 wks 65%ile, AC 96%ile.          Relevant Orders    POC Urinalysis Dipstick (Completed)    History of pregnancy induced hypertension    Overview   Baby asa  With G1         Rh negative, antepartum    History of herpes genitalis    Overview   Suppression 36 wks            Pregnancy at 37w6d. Initial mild range pressure, with normal repeat. Urine neg. Nst reactive. She will  monitor at home.   Fetal status reassuring.   Reviewed Pre-eclampsia signs/symptoms  Delivery options reviewed with patient  Signs of labor reviewed  Kick counts reviewed  Activity and Exercise discussed.  Return in about 1 week (around 5/28/2025) for F/U Prenatal.    Danya Lim MD  05/21/2025

## 2025-05-25 ENCOUNTER — HOSPITAL ENCOUNTER (INPATIENT)
Facility: HOSPITAL | Age: 35
LOS: 3 days | Discharge: HOME | End: 2025-05-28
Attending: OBSTETRICS & GYNECOLOGY | Admitting: OBSTETRICS & GYNECOLOGY
Payer: COMMERCIAL

## 2025-05-25 PROBLEM — O13.9 GESTATIONAL HYPERTENSION WITHOUT SIGNIFICANT PROTEINURIA: Status: ACTIVE | Noted: 2025-05-25

## 2025-05-25 LAB
ABO GROUP BLD: NORMAL
ALP SERPL-CCNC: 147 U/L (ref 39–117)
ALT SERPL W P-5'-P-CCNC: 8 U/L (ref 1–33)
AST SERPL-CCNC: 23 U/L (ref 1–32)
BASOPHILS # BLD AUTO: 0.05 10*3/MM3 (ref 0–0.2)
BASOPHILS NFR BLD AUTO: 0.4 % (ref 0–1.5)
BILIRUB SERPL-MCNC: 0.4 MG/DL (ref 0–1.2)
BLD GP AB SCN SERPL QL: POSITIVE
CREAT SERPL-MCNC: 0.49 MG/DL (ref 0.57–1)
DEPRECATED RDW RBC AUTO: 42.2 FL (ref 37–54)
EOSINOPHIL # BLD AUTO: 0.16 10*3/MM3 (ref 0–0.4)
EOSINOPHIL NFR BLD AUTO: 1.2 % (ref 0.3–6.2)
ERYTHROCYTE [DISTWIDTH] IN BLOOD BY AUTOMATED COUNT: 14.4 % (ref 12.3–15.4)
HCT VFR BLD AUTO: 30.4 % (ref 34–46.6)
HGB BLD-MCNC: 9.9 G/DL (ref 12–15.9)
IMM GRANULOCYTES # BLD AUTO: 0.12 10*3/MM3 (ref 0–0.05)
IMM GRANULOCYTES NFR BLD AUTO: 0.9 % (ref 0–0.5)
LDH SERPL-CCNC: 233 U/L (ref 135–214)
LYMPHOCYTES # BLD AUTO: 1.94 10*3/MM3 (ref 0.7–3.1)
LYMPHOCYTES NFR BLD AUTO: 14.9 % (ref 19.6–45.3)
MCH RBC QN AUTO: 26.7 PG (ref 26.6–33)
MCHC RBC AUTO-ENTMCNC: 32.6 G/DL (ref 31.5–35.7)
MCV RBC AUTO: 81.9 FL (ref 79–97)
MONOCYTES # BLD AUTO: 1.08 10*3/MM3 (ref 0.1–0.9)
MONOCYTES NFR BLD AUTO: 8.3 % (ref 5–12)
NEUTROPHILS NFR BLD AUTO: 74.3 % (ref 42.7–76)
NEUTROPHILS NFR BLD AUTO: 9.64 10*3/MM3 (ref 1.7–7)
NRBC BLD AUTO-RTO: 0 /100 WBC (ref 0–0.2)
PLATELET # BLD AUTO: 229 10*3/MM3 (ref 140–450)
PMV BLD AUTO: 10.5 FL (ref 6–12)
RBC # BLD AUTO: 3.71 10*6/MM3 (ref 3.77–5.28)
RH BLD: NEGATIVE
T&S EXPIRATION DATE: NORMAL
URATE SERPL-MCNC: 3.4 MG/DL (ref 2.4–5.7)
WBC NRBC COR # BLD AUTO: 12.99 10*3/MM3 (ref 3.4–10.8)

## 2025-05-25 PROCEDURE — 85025 COMPLETE CBC W/AUTO DIFF WBC: CPT | Performed by: OBSTETRICS & GYNECOLOGY

## 2025-05-25 PROCEDURE — 25010000002 PENICILLIN G POTASSIUM PER 600000 UNITS: Performed by: OBSTETRICS & GYNECOLOGY

## 2025-05-25 PROCEDURE — 86850 RBC ANTIBODY SCREEN: CPT | Performed by: OBSTETRICS & GYNECOLOGY

## 2025-05-25 PROCEDURE — 86870 RBC ANTIBODY IDENTIFICATION: CPT | Performed by: OBSTETRICS & GYNECOLOGY

## 2025-05-25 PROCEDURE — 83615 LACTATE (LD) (LDH) ENZYME: CPT | Performed by: OBSTETRICS & GYNECOLOGY

## 2025-05-25 PROCEDURE — 36415 COLL VENOUS BLD VENIPUNCTURE: CPT | Performed by: OBSTETRICS & GYNECOLOGY

## 2025-05-25 PROCEDURE — 86780 TREPONEMA PALLIDUM: CPT | Performed by: OBSTETRICS & GYNECOLOGY

## 2025-05-25 PROCEDURE — 82247 BILIRUBIN TOTAL: CPT | Performed by: OBSTETRICS & GYNECOLOGY

## 2025-05-25 PROCEDURE — 86900 BLOOD TYPING SEROLOGIC ABO: CPT | Performed by: OBSTETRICS & GYNECOLOGY

## 2025-05-25 PROCEDURE — 25810000003 LACTATED RINGERS PER 1000 ML: Performed by: OBSTETRICS & GYNECOLOGY

## 2025-05-25 PROCEDURE — 84460 ALANINE AMINO (ALT) (SGPT): CPT | Performed by: OBSTETRICS & GYNECOLOGY

## 2025-05-25 PROCEDURE — 59025 FETAL NON-STRESS TEST: CPT

## 2025-05-25 PROCEDURE — 84550 ASSAY OF BLOOD/URIC ACID: CPT | Performed by: OBSTETRICS & GYNECOLOGY

## 2025-05-25 PROCEDURE — 86901 BLOOD TYPING SEROLOGIC RH(D): CPT | Performed by: OBSTETRICS & GYNECOLOGY

## 2025-05-25 PROCEDURE — 82565 ASSAY OF CREATININE: CPT | Performed by: OBSTETRICS & GYNECOLOGY

## 2025-05-25 PROCEDURE — 84450 TRANSFERASE (AST) (SGOT): CPT | Performed by: OBSTETRICS & GYNECOLOGY

## 2025-05-25 PROCEDURE — 84075 ASSAY ALKALINE PHOSPHATASE: CPT | Performed by: OBSTETRICS & GYNECOLOGY

## 2025-05-25 RX ORDER — CARBOPROST TROMETHAMINE 250 UG/ML
250 INJECTION, SOLUTION INTRAMUSCULAR AS NEEDED
OUTPATIENT
Start: 2025-05-25

## 2025-05-25 RX ORDER — ONDANSETRON 2 MG/ML
4 INJECTION INTRAMUSCULAR; INTRAVENOUS EVERY 6 HOURS PRN
OUTPATIENT
Start: 2025-05-25

## 2025-05-25 RX ORDER — LIDOCAINE HYDROCHLORIDE 20 MG/ML
20 INJECTION, SOLUTION INFILTRATION; PERINEURAL AS NEEDED
OUTPATIENT
Start: 2025-05-25

## 2025-05-25 RX ORDER — FAMOTIDINE 20 MG/1
20 TABLET, FILM COATED ORAL ONCE AS NEEDED
OUTPATIENT
Start: 2025-05-25

## 2025-05-25 RX ORDER — FAMOTIDINE 20 MG/1
20 TABLET, FILM COATED ORAL EVERY 12 HOURS SCHEDULED
Status: DISCONTINUED | OUTPATIENT
Start: 2025-05-25 | End: 2025-05-26

## 2025-05-25 RX ORDER — BUTORPHANOL TARTRATE 1 MG/ML
1 INJECTION, SOLUTION INTRAMUSCULAR; INTRAVENOUS
Status: DISCONTINUED | OUTPATIENT
Start: 2025-05-25 | End: 2025-05-26

## 2025-05-25 RX ORDER — SODIUM CHLORIDE 0.9 % (FLUSH) 0.9 %
10 SYRINGE (ML) INJECTION AS NEEDED
Status: DISCONTINUED | OUTPATIENT
Start: 2025-05-25 | End: 2025-05-26

## 2025-05-25 RX ORDER — SODIUM CHLORIDE, SODIUM LACTATE, POTASSIUM CHLORIDE, CALCIUM CHLORIDE 600; 310; 30; 20 MG/100ML; MG/100ML; MG/100ML; MG/100ML
125 INJECTION, SOLUTION INTRAVENOUS CONTINUOUS
Status: DISCONTINUED | OUTPATIENT
Start: 2025-05-25 | End: 2025-05-26

## 2025-05-25 RX ORDER — SODIUM CHLORIDE 0.9 % (FLUSH) 0.9 %
10 SYRINGE (ML) INJECTION EVERY 12 HOURS SCHEDULED
Status: DISCONTINUED | OUTPATIENT
Start: 2025-05-25 | End: 2025-05-26

## 2025-05-25 RX ORDER — HYDROCODONE BITARTRATE AND ACETAMINOPHEN 10; 325 MG/1; MG/1
1 TABLET ORAL EVERY 4 HOURS PRN
Refills: 0 | OUTPATIENT
Start: 2025-05-25 | End: 2025-05-30

## 2025-05-25 RX ORDER — FAMOTIDINE 10 MG/ML
20 INJECTION, SOLUTION INTRAVENOUS ONCE AS NEEDED
OUTPATIENT
Start: 2025-05-25

## 2025-05-25 RX ORDER — MISOPROSTOL 200 UG/1
800 TABLET ORAL AS NEEDED
OUTPATIENT
Start: 2025-05-25

## 2025-05-25 RX ORDER — SODIUM CHLORIDE 9 MG/ML
40 INJECTION, SOLUTION INTRAVENOUS AS NEEDED
Status: DISCONTINUED | OUTPATIENT
Start: 2025-05-25 | End: 2025-05-26

## 2025-05-25 RX ORDER — OXYTOCIN/0.9 % SODIUM CHLORIDE 30/500 ML
250 PLASTIC BAG, INJECTION (ML) INTRAVENOUS CONTINUOUS
OUTPATIENT
Start: 2025-05-25 | End: 2025-05-25

## 2025-05-25 RX ORDER — MAGNESIUM CARB/ALUMINUM HYDROX 105-160MG
30 TABLET,CHEWABLE ORAL ONCE
Status: DISCONTINUED | OUTPATIENT
Start: 2025-05-25 | End: 2025-05-26

## 2025-05-25 RX ORDER — ONDANSETRON 4 MG/1
4 TABLET, ORALLY DISINTEGRATING ORAL EVERY 6 HOURS PRN
OUTPATIENT
Start: 2025-05-25

## 2025-05-25 RX ORDER — FAMOTIDINE 10 MG/ML
20 INJECTION, SOLUTION INTRAVENOUS EVERY 12 HOURS SCHEDULED
Status: DISCONTINUED | OUTPATIENT
Start: 2025-05-25 | End: 2025-05-26

## 2025-05-25 RX ORDER — MAGNESIUM CARB/ALUMINUM HYDROX 105-160MG
30 TABLET,CHEWABLE ORAL ONCE AS NEEDED
OUTPATIENT
Start: 2025-05-25

## 2025-05-25 RX ORDER — LIDOCAINE HYDROCHLORIDE 10 MG/ML
0.5 INJECTION, SOLUTION EPIDURAL; INFILTRATION; INTRACAUDAL; PERINEURAL ONCE AS NEEDED
Status: DISCONTINUED | OUTPATIENT
Start: 2025-05-25 | End: 2025-05-26

## 2025-05-25 RX ORDER — ACETAMINOPHEN 325 MG/1
650 TABLET ORAL EVERY 4 HOURS PRN
Status: DISCONTINUED | OUTPATIENT
Start: 2025-05-25 | End: 2025-05-26

## 2025-05-25 RX ORDER — PENICILLIN G 3000000 [IU]/50ML
3 INJECTION, SOLUTION INTRAVENOUS EVERY 4 HOURS
Status: DISCONTINUED | OUTPATIENT
Start: 2025-05-26 | End: 2025-05-26

## 2025-05-25 RX ORDER — OXYTOCIN/0.9 % SODIUM CHLORIDE 30/500 ML
999 PLASTIC BAG, INJECTION (ML) INTRAVENOUS ONCE
OUTPATIENT
Start: 2025-05-25 | End: 2025-05-25

## 2025-05-25 RX ORDER — OXYTOCIN/0.9 % SODIUM CHLORIDE 30/500 ML
2-20 PLASTIC BAG, INJECTION (ML) INTRAVENOUS
Status: DISCONTINUED | OUTPATIENT
Start: 2025-05-25 | End: 2025-05-26

## 2025-05-25 RX ORDER — BUTORPHANOL TARTRATE 2 MG/ML
2 INJECTION, SOLUTION INTRAMUSCULAR; INTRAVENOUS
Status: DISCONTINUED | OUTPATIENT
Start: 2025-05-25 | End: 2025-05-26

## 2025-05-25 RX ORDER — IBUPROFEN 600 MG/1
600 TABLET, FILM COATED ORAL EVERY 6 HOURS PRN
OUTPATIENT
Start: 2025-05-25

## 2025-05-25 RX ADMIN — ACETAMINOPHEN 650 MG: 325 TABLET ORAL at 22:59

## 2025-05-25 RX ADMIN — SODIUM CHLORIDE, POTASSIUM CHLORIDE, SODIUM LACTATE AND CALCIUM CHLORIDE 125 ML/HR: 600; 310; 30; 20 INJECTION, SOLUTION INTRAVENOUS at 22:59

## 2025-05-25 RX ADMIN — SODIUM CHLORIDE 5 MILLION UNITS: 900 INJECTION INTRAVENOUS at 22:59

## 2025-05-25 RX ADMIN — Medication 2 MILLI-UNITS/MIN: at 23:01

## 2025-05-26 ENCOUNTER — ANESTHESIA (OUTPATIENT)
Dept: LABOR AND DELIVERY | Facility: HOSPITAL | Age: 35
End: 2025-05-26
Payer: COMMERCIAL

## 2025-05-26 ENCOUNTER — ANESTHESIA EVENT (OUTPATIENT)
Dept: LABOR AND DELIVERY | Facility: HOSPITAL | Age: 35
End: 2025-05-26
Payer: COMMERCIAL

## 2025-05-26 LAB
ABO GROUP BLD: NORMAL
FETAL BLEED: NEGATIVE
NUMBER OF DOSES: NORMAL
RESIDUAL RHIG DETECTED: NORMAL
RH BLD: NEGATIVE
TREPONEMA PALLIDUM IGG+IGM AB [PRESENCE] IN SERUM OR PLASMA BY IMMUNOASSAY: NORMAL

## 2025-05-26 PROCEDURE — 25010000002 ROPIVACAINE PER 1 MG: Performed by: ANESTHESIOLOGY

## 2025-05-26 PROCEDURE — C1755 CATHETER, INTRASPINAL: HCPCS | Performed by: ANESTHESIOLOGY

## 2025-05-26 PROCEDURE — 25810000003 LACTATED RINGERS SOLUTION: Performed by: ANESTHESIOLOGY

## 2025-05-26 PROCEDURE — 25810000003 LACTATED RINGERS PER 1000 ML: Performed by: OBSTETRICS & GYNECOLOGY

## 2025-05-26 PROCEDURE — 25010000002 BUPIVACAINE (PF) 0.25 % SOLUTION: Performed by: ANESTHESIOLOGY

## 2025-05-26 PROCEDURE — 59400 OBSTETRICAL CARE: CPT | Performed by: OBSTETRICS & GYNECOLOGY

## 2025-05-26 PROCEDURE — 25010000002 RHO D IMMUNE GLOBULIN 1500 UNIT/2ML SOLUTION PREFILLED SYRINGE: Performed by: OBSTETRICS & GYNECOLOGY

## 2025-05-26 PROCEDURE — 25010000002 FENTANYL CITRATE (PF) 50 MCG/ML SOLUTION: Performed by: ANESTHESIOLOGY

## 2025-05-26 PROCEDURE — 25010000002 FAMOTIDINE 10 MG/ML SOLUTION: Performed by: OBSTETRICS & GYNECOLOGY

## 2025-05-26 PROCEDURE — 25010000002 PENICILLIN G POTASSIUM PER 600000 UNITS: Performed by: OBSTETRICS & GYNECOLOGY

## 2025-05-26 PROCEDURE — 25010000002 LIDOCAINE-EPINEPHRINE (PF) 1.5 %-1:200000 SOLUTION: Performed by: ANESTHESIOLOGY

## 2025-05-26 PROCEDURE — 0KQM0ZZ REPAIR PERINEUM MUSCLE, OPEN APPROACH: ICD-10-PCS | Performed by: OBSTETRICS & GYNECOLOGY

## 2025-05-26 PROCEDURE — 86901 BLOOD TYPING SEROLOGIC RH(D): CPT | Performed by: OBSTETRICS & GYNECOLOGY

## 2025-05-26 PROCEDURE — 59025 FETAL NON-STRESS TEST: CPT

## 2025-05-26 PROCEDURE — 86900 BLOOD TYPING SEROLOGIC ABO: CPT | Performed by: OBSTETRICS & GYNECOLOGY

## 2025-05-26 PROCEDURE — C1755 CATHETER, INTRASPINAL: HCPCS

## 2025-05-26 PROCEDURE — 85461 HEMOGLOBIN FETAL: CPT | Performed by: OBSTETRICS & GYNECOLOGY

## 2025-05-26 RX ORDER — SODIUM CHLORIDE 0.9 % (FLUSH) 0.9 %
1-10 SYRINGE (ML) INJECTION AS NEEDED
Status: DISCONTINUED | OUTPATIENT
Start: 2025-05-26 | End: 2025-05-28 | Stop reason: HOSPADM

## 2025-05-26 RX ORDER — OXYTOCIN/0.9 % SODIUM CHLORIDE 30/500 ML
125 PLASTIC BAG, INJECTION (ML) INTRAVENOUS ONCE AS NEEDED
Status: DISCONTINUED | OUTPATIENT
Start: 2025-05-26 | End: 2025-05-28 | Stop reason: HOSPADM

## 2025-05-26 RX ORDER — ONDANSETRON 2 MG/ML
4 INJECTION INTRAMUSCULAR; INTRAVENOUS EVERY 6 HOURS PRN
Status: DISCONTINUED | OUTPATIENT
Start: 2025-05-26 | End: 2025-05-28 | Stop reason: HOSPADM

## 2025-05-26 RX ORDER — IBUPROFEN 600 MG/1
600 TABLET, FILM COATED ORAL EVERY 6 HOURS PRN
Status: DISCONTINUED | OUTPATIENT
Start: 2025-05-26 | End: 2025-05-28 | Stop reason: HOSPADM

## 2025-05-26 RX ORDER — THIAMINE HYDROCHLORIDE 100 MG/ML
100 INJECTION, SOLUTION INTRAMUSCULAR; INTRAVENOUS ONCE
Status: CANCELLED | OUTPATIENT
Start: 2025-05-26 | End: 2025-05-26

## 2025-05-26 RX ORDER — ROPIVACAINE HYDROCHLORIDE 2 MG/ML
15 INJECTION, SOLUTION EPIDURAL; INFILTRATION; PERINEURAL CONTINUOUS
Status: DISCONTINUED | OUTPATIENT
Start: 2025-05-26 | End: 2025-05-26

## 2025-05-26 RX ORDER — ACETAMINOPHEN 325 MG/1
650 TABLET ORAL EVERY 6 HOURS PRN
Status: DISCONTINUED | OUTPATIENT
Start: 2025-05-26 | End: 2025-05-28 | Stop reason: HOSPADM

## 2025-05-26 RX ORDER — EPHEDRINE SULFATE 5 MG/ML
10 INJECTION INTRAVENOUS
Status: DISCONTINUED | OUTPATIENT
Start: 2025-05-26 | End: 2025-05-26

## 2025-05-26 RX ORDER — CALCIUM CARBONATE 500 MG/1
1 TABLET, CHEWABLE ORAL EVERY 6 HOURS PRN
Status: DISCONTINUED | OUTPATIENT
Start: 2025-05-26 | End: 2025-05-26

## 2025-05-26 RX ORDER — MISOPROSTOL 200 UG/1
800 TABLET ORAL AS NEEDED
Status: DISCONTINUED | OUTPATIENT
Start: 2025-05-26 | End: 2025-05-28 | Stop reason: HOSPADM

## 2025-05-26 RX ORDER — FENTANYL CITRATE 50 UG/ML
INJECTION, SOLUTION INTRAMUSCULAR; INTRAVENOUS AS NEEDED
Status: DISCONTINUED | OUTPATIENT
Start: 2025-05-26 | End: 2025-05-26 | Stop reason: SURG

## 2025-05-26 RX ORDER — METOCLOPRAMIDE HYDROCHLORIDE 5 MG/ML
10 INJECTION INTRAMUSCULAR; INTRAVENOUS ONCE AS NEEDED
Status: DISCONTINUED | OUTPATIENT
Start: 2025-05-26 | End: 2025-05-26

## 2025-05-26 RX ORDER — LIDOCAINE HYDROCHLORIDE AND EPINEPHRINE 15; 5 MG/ML; UG/ML
INJECTION, SOLUTION EPIDURAL AS NEEDED
Status: DISCONTINUED | OUTPATIENT
Start: 2025-05-26 | End: 2025-05-26 | Stop reason: SURG

## 2025-05-26 RX ORDER — HYDROCORTISONE 25 MG/G
1 CREAM TOPICAL AS NEEDED
Status: DISCONTINUED | OUTPATIENT
Start: 2025-05-26 | End: 2025-05-28 | Stop reason: HOSPADM

## 2025-05-26 RX ORDER — DOCUSATE SODIUM 100 MG/1
100 CAPSULE, LIQUID FILLED ORAL 2 TIMES DAILY
Status: DISCONTINUED | OUTPATIENT
Start: 2025-05-26 | End: 2025-05-28 | Stop reason: HOSPADM

## 2025-05-26 RX ORDER — ONDANSETRON 4 MG/1
4 TABLET, ORALLY DISINTEGRATING ORAL EVERY 6 HOURS PRN
Status: DISCONTINUED | OUTPATIENT
Start: 2025-05-26 | End: 2025-05-28 | Stop reason: HOSPADM

## 2025-05-26 RX ORDER — DIPHENHYDRAMINE HYDROCHLORIDE 50 MG/ML
12.5 INJECTION, SOLUTION INTRAMUSCULAR; INTRAVENOUS EVERY 8 HOURS PRN
Status: DISCONTINUED | OUTPATIENT
Start: 2025-05-26 | End: 2025-05-26

## 2025-05-26 RX ORDER — BUPIVACAINE HYDROCHLORIDE 2.5 MG/ML
INJECTION, SOLUTION EPIDURAL; INFILTRATION; INTRACAUDAL; PERINEURAL AS NEEDED
Status: DISCONTINUED | OUTPATIENT
Start: 2025-05-26 | End: 2025-05-26 | Stop reason: SURG

## 2025-05-26 RX ORDER — BISACODYL 10 MG
10 SUPPOSITORY, RECTAL RECTAL DAILY PRN
Status: DISCONTINUED | OUTPATIENT
Start: 2025-05-27 | End: 2025-05-28 | Stop reason: HOSPADM

## 2025-05-26 RX ORDER — CITRIC ACID/SODIUM CITRATE 334-500MG
30 SOLUTION, ORAL ORAL ONCE
Status: DISCONTINUED | OUTPATIENT
Start: 2025-05-26 | End: 2025-05-26

## 2025-05-26 RX ORDER — PROMETHAZINE HYDROCHLORIDE 12.5 MG/1
12.5 TABLET ORAL EVERY 4 HOURS PRN
Status: DISCONTINUED | OUTPATIENT
Start: 2025-05-26 | End: 2025-05-28 | Stop reason: HOSPADM

## 2025-05-26 RX ORDER — HYDROCODONE BITARTRATE AND ACETAMINOPHEN 10; 325 MG/1; MG/1
1 TABLET ORAL EVERY 4 HOURS PRN
Status: DISCONTINUED | OUTPATIENT
Start: 2025-05-26 | End: 2025-05-28 | Stop reason: HOSPADM

## 2025-05-26 RX ORDER — EPHEDRINE SULFATE 5 MG/ML
INJECTION INTRAVENOUS
Status: DISCONTINUED
Start: 2025-05-26 | End: 2025-05-26 | Stop reason: WASHOUT

## 2025-05-26 RX ORDER — HYDROCODONE BITARTRATE AND ACETAMINOPHEN 5; 325 MG/1; MG/1
1 TABLET ORAL EVERY 4 HOURS PRN
Status: DISCONTINUED | OUTPATIENT
Start: 2025-05-26 | End: 2025-05-28 | Stop reason: HOSPADM

## 2025-05-26 RX ADMIN — SODIUM CHLORIDE, POTASSIUM CHLORIDE, SODIUM LACTATE AND CALCIUM CHLORIDE 1000 ML: 600; 310; 30; 20 INJECTION, SOLUTION INTRAVENOUS at 11:00

## 2025-05-26 RX ADMIN — PENICILLIN G 3 MILLION UNITS: 3000000 INJECTION, SOLUTION INTRAVENOUS at 04:06

## 2025-05-26 RX ADMIN — PENICILLIN G 3 MILLION UNITS: 3000000 INJECTION, SOLUTION INTRAVENOUS at 11:01

## 2025-05-26 RX ADMIN — Medication: at 17:23

## 2025-05-26 RX ADMIN — CALCIUM CARBONATE 1 TABLET: 500 TABLET, CHEWABLE ORAL at 05:56

## 2025-05-26 RX ADMIN — FAMOTIDINE 20 MG: 10 INJECTION, SOLUTION INTRAVENOUS at 11:47

## 2025-05-26 RX ADMIN — LIDOCAINE HYDROCHLORIDE AND EPINEPHRINE 3 ML: 15; 5 INJECTION, SOLUTION EPIDURAL at 11:14

## 2025-05-26 RX ADMIN — PENICILLIN G 3 MILLION UNITS: 3000000 INJECTION, SOLUTION INTRAVENOUS at 07:36

## 2025-05-26 RX ADMIN — CALCIUM CARBONATE 1 TABLET: 500 TABLET, CHEWABLE ORAL at 11:47

## 2025-05-26 RX ADMIN — ROPIVACAINE HYDROCHLORIDE 14 ML/HR: 2 INJECTION, SOLUTION EPIDURAL; INFILTRATION at 11:20

## 2025-05-26 RX ADMIN — BUPIVACAINE HYDROCHLORIDE 8 ML: 2.5 INJECTION, SOLUTION EPIDURAL; INFILTRATION; INTRACAUDAL; PERINEURAL at 11:16

## 2025-05-26 RX ADMIN — ACETAMINOPHEN 650 MG: 325 TABLET ORAL at 10:23

## 2025-05-26 RX ADMIN — WITCH HAZEL: 500 SOLUTION RECTAL; TOPICAL at 17:23

## 2025-05-26 RX ADMIN — FENTANYL CITRATE 100 MCG: 50 INJECTION, SOLUTION INTRAMUSCULAR; INTRAVENOUS at 11:16

## 2025-05-26 RX ADMIN — LIDOCAINE HYDROCHLORIDE AND EPINEPHRINE 2 ML: 15; 5 INJECTION, SOLUTION EPIDURAL at 11:15

## 2025-05-26 RX ADMIN — IBUPROFEN 600 MG: 600 TABLET ORAL at 21:23

## 2025-05-26 RX ADMIN — ACETAMINOPHEN 650 MG: 325 TABLET ORAL at 23:46

## 2025-05-26 RX ADMIN — HUMAN RHO(D) IMMUNE GLOBULIN 1500 UNITS: 1500 SOLUTION INTRAMUSCULAR; INTRAVENOUS at 21:23

## 2025-05-26 RX ADMIN — DOCUSATE SODIUM 100 MG: 100 CAPSULE, LIQUID FILLED ORAL at 21:23

## 2025-05-26 RX ADMIN — ACETAMINOPHEN 650 MG: 325 TABLET ORAL at 17:22

## 2025-05-26 RX ADMIN — ACETAMINOPHEN 650 MG: 325 TABLET ORAL at 05:56

## 2025-05-26 RX ADMIN — SODIUM CHLORIDE, POTASSIUM CHLORIDE, SODIUM LACTATE AND CALCIUM CHLORIDE 125 ML/HR: 600; 310; 30; 20 INJECTION, SOLUTION INTRAVENOUS at 08:36

## 2025-05-26 NOTE — LACTATION NOTE
25 1800   Maternal Information   Date of Referral 25   Person Making Referral lactation consultant  (newly postpartum)   Maternal Reason for Referral previous breastfeeding issues  (breastfeed for 1 week and then began giving formula.  Covid positive)   Infant Reason for Referral  infant   Maternal Assessment   Breast Size Issue none   Breast Shape Bilateral:;round   Breast Density Bilateral:;soft   Nipples Bilateral:;everted   Left Nipple Symptoms intact;nontender   Right Nipple Symptoms intact;nontender   Maternal Infant Feeding   Maternal Emotional State relaxed;receptive   Support Person Involvement verbally supports mother  (currently holding infant while sitting in chair)   Milk Expression/Equipment   Breast Pump Type other (see comments)  (does not like to pump.  Offered a manual pump, if she desires)   Breast Pumping   Breast Pumping Interventions other (see comments)  (can pump or do hand expression if would like)   Lactation Referrals   Lactation Referrals outpatient lactation program  (as needed after discharge)     Courtesy visit with breastfeeding mother. Patient reported that she had recently fed infant on both sides for 10 minutes each.  Father of infant currently holding infant while sitting in chair. Encouraged PRN lactation as needed and discussed the outpatient lactation clinic for any needs after discharge.  Went over basic breastfeeding information and provided written materials with a QR code to  and breastpump QR codes.  Encouraged mother to look at the hospital booklet starting on page 35 for breastfeeding information. Encouraged attempting to breastfeed every 3 hours or more often with feeding cues, using stimulation to encourage high quality milk transfer.

## 2025-05-26 NOTE — ANESTHESIA PROCEDURE NOTES
Labor Epidural      Patient reassessed immediately prior to procedure    Patient location during procedure: OB  Performed By  Anesthesiologist: Carroll Lafleur DO  Preanesthetic Checklist  Completed: patient identified, IV checked, site marked, risks and benefits discussed, surgical consent, monitors and equipment checked, pre-op evaluation and timeout performed  Prep:  Pt Position:sitting  Sterile Tech:gloves, mask, sterile barrier and cap  Prep:chlorhexidine gluconate and isopropyl alcohol  Monitoring:blood pressure monitoring and continuous pulse oximetry  Epidural Block Procedure:  Approach:midline  Guidance:landmark technique and palpation technique  Location:L3-L4  Needle Type:Tuohy  Needle Gauge:17 G  Loss of Resistance Medium: air  Loss of Resistance: 5cm  Cath Depth at skin:11 cm  Paresthesia: none  Aspiration:negative  Test Dose:negative  Number of Attempts: 1  Post Assessment:  Dressing:secured with tape and occlusive dressing applied (Tegaderm Placed)  Pt Tolerance:patient tolerated the procedure well with no apparent complications  Complications:no

## 2025-05-26 NOTE — ANESTHESIA PREPROCEDURE EVALUATION
Anesthesia Evaluation     Patient summary reviewed and Nursing notes reviewed                Airway   Mallampati: II  TM distance: >3 FB  Neck ROM: full  No difficulty expected  Dental      Pulmonary - negative pulmonary ROS   Cardiovascular     (+) hypertension      Neuro/Psych  (+) headaches  GI/Hepatic/Renal/Endo - negative ROS     Musculoskeletal (-) negative ROS    Abdominal    Substance History - negative use     OB/GYN    (+) Pregnant, pregnancy induced hypertension        Other                    Anesthesia Plan    ASA 2     epidural       Anesthetic plan, risks, benefits, and alternatives have been provided, discussed and informed consent has been obtained with: patient.    Use of blood products discussed with patient .      CODE STATUS:    Code Status (Patient has no pulse and is not breathing): CPR (Attempt to Resuscitate)  Medical Interventions (Patient has pulse or is breathing): Full Support  Level Of Support Discussed With: Patient

## 2025-05-26 NOTE — L&D DELIVERY NOTE
" [facility]   Vaginal Delivery Note    Patient Name: Elma Lima  : 1990  MRN: [id number]    Date of Delivery: 2025    Diagnosis     Pre & Post-Delivery:  Intrauterine pregnancy at 38w4d  Labor status: Induced Onset of Labor    Gestational hypertension without significant proteinuria     (spontaneous vaginal delivery)             Problem List    Transfer to Postpartum     Review the Delivery Report for details.     Delivery     Delivery: Vaginal, Spontaneous    YOB: 2025   Time of Birth:  Gestational Age 1:25 PM  38w4d     Anesthesia: Epidural    Delivering clinician: Lyudmila Larsen   Forceps?   No   Vacuum? No    Shoulder dystocia present: No        Delivery narrative: Patient pushed well for approximately 15 minutes.  On her final contraction, head completely rotated into OA position and delivered without complication.  Shoulders and body easily followed.  Vigorous female infant laid on maternal abdomen.  Nose and mouth were bulb suction.  After 1 minute, the cord was clamped x 2.  Father cut cord.  Cord blood was obtained.  Placenta delivered spontaneously intact.  Small second-degree laceration repaired with a 3-0 Vicryl.  All counts correct.      Infant     Findings: female infant     Infant observations: Weight: 3555 g (7 lb 13.4 oz)  Length: 18.7 in  Observations/Comments:        Apgars: 9  @ 1 minute /    9  @ 5 minutes   Infant Name: Coe     Placenta & Cord         Placenta delivered  Spontaneous at        Cord: 3 vessels present.   Nuchal Cord?  no   Cord blood obtained: Yes   Cord gases obtained:  No   Cord gas results: Venous:  No results found for: \"PHCVEN\", \"BECVEN\"    Arterial:  No results found for: \"PHCART\", \"BECART\"     Repair     Episiotomy: None    No    Lacerations: Yes  Laceration Information  Laceration Repaired?   Perineal: 2nd Yes   Periurethral:       Labial:       Sulcus:       Vaginal:       Cervical:         Suture used for repair: " 3-0 Vicryl     Estimated Blood Loss: Est. Blood Loss (mL): 200 mL (Filed from Delivery Summary) (05/26/25 1325)     Quantitative Blood Loss:        TOTAL BLOOD LOSS : 200 mL (5/25/2025  8:24 PM - 5/26/2025  1:44 PM)  Complications     none    Disposition     Mother to Mother Baby/Postpartum  in stable condition currently.  Baby to remains with mom  in stable condition currently.    Lyudmila Larsen MD  05/26/25  13:44 EDT

## 2025-05-26 NOTE — H&P
History and Physical:    Subjective     Chief Complaint   Patient presents with    Elevated Blood Pressure     At urgent care       Elma Lima is a 34 y.o. year old  with an Estimated Date of Delivery: 25 currently at 38w4d presenting with  mild headache and elevated blood pressures noted.  She was seen at urgent care center and was positive for COVID.  Blood pressures noted to be 130s over 90s to 100.  Preeclampsia panel all negative. .    Prenatal care has been with Danya Lim MD.  It has been significant for No Complications.      Review of Systems   Constitutional: Negative.    HENT: Negative.     Respiratory: Negative.     Cardiovascular: Negative.    Gastrointestinal: Negative.    Genitourinary: Negative.    Musculoskeletal: Negative.    Skin: Negative.    Allergic/Immunologic: Negative.    Neurological: Negative.    Hematological: Negative.    Psychiatric/Behavioral: Negative.             Past Medical History:   Diagnosis Date    Gestational hypertension     with G1    HSV-1 (herpes simplex virus 1) infection     Migraine     Rh incompatibility     Tuberculosis Dec 2013 positive for latent    Treatment completed at [facility] Infectious disease    Varicella Small child     Past Surgical History:   Procedure Laterality Date    LAPAROSCOPIC CHOLECYSTECTOMY  22    MANDIBLE FRACTURE SURGERY      WISDOM TOOTH EXTRACTION       Family History   Problem Relation Age of Onset    Breast cancer Maternal Grandmother     Stroke Maternal Grandmother     Breast cancer Paternal Grandmother      Social History     Tobacco Use    Smoking status: Never     Passive exposure: Never    Smokeless tobacco: Never   Vaping Use    Vaping status: Never Used   Substance Use Topics    Alcohol use: Not Currently    Drug use: Never     Medications Prior to Admission   Medication Sig Dispense Refill Last Dose/Taking    ferrous sulfate 325 (65 FE) MG tablet Take 1 tablet by mouth Daily With Breakfast.   " Past Week    Prenatal Vit-Fe Fumarate-FA (PRENATAL VITAMIN PO) Take  by mouth.   2025    valACYclovir (Valtrex) 500 MG tablet Take 1 tablet by mouth 2 (Two) Times a Day for 30 days. 60 tablet 1 2025     Allergies:  Ceclor [cefaclor]  OB History    Para Term  AB Living   3 2 2 0 0 2   SAB IAB Ectopic Molar Multiple Live Births   0 0 0 0 0 2      # Outcome Date GA Lbr Emeterio/2nd Weight Sex Type Anes PTL Lv   3 Current            2 Term 23 38w6d  3270 g (7 lb 3.3 oz) F Vag-Spont EPI N DAVON      Complications: Fetal Intolerance   1 Term 22 38w6d 08:09 / 02:11 3390 g (7 lb 7.6 oz) M Vag-Spont EPI N DAVON      Obstetric Comments   FOB #1 Pregnancy #1 & 2             Objective     /80 (BP Location: Left arm, Patient Position: Sitting)   Pulse 92   Temp 97.3 °F (36.3 °C) (Oral)   Resp 20   Ht 162.6 cm (64\")   Wt 80.7 kg (178 lb)   LMP 2024   SpO2 99%   Breastfeeding No   BMI 30.55 kg/m²     Physical Exam    General:  No acute distress   Lung: Clear to auscultation bilaterally, no wheezing, clear at bases   Heart: Regular rate and rhythm, no murmurs    Abdomen: Gravid, nontender   Extremities: 2+ DTR's bilaterally, no edema   FHT's: reactive    Cervix: 3-4/80/-2 AROM copious clear.  IUPC placed without difficulty   [place]: Contraction are 2-3           Lab Review   External Prenatal Results       Pregnancy Outside Results - Transcribed From Office Records - See Scanned Records For Details       Test Value Date Time    ABO  A  25    Rh  Negative  25    Antibody Screen  Positive  25       Negative  25 142       Negative  24 142    Varicella IgG       Rubella  2.62 index 24 142    Hgb  9.9 g/dL 25       10.4 g/dL 25 1426       13.2 g/dL 24 142    Hct  30.4 % 25       31.3 % 25 1426       39.7 % 24 1425    HgB A1c        1h GTT  109 mg/dL 25 1426    3h GTT Fasting       3h " GTT 1 hour       3h GTT 2 hour       3h GTT 3 hour        Gonorrhea (discrete)  Negative  11/06/24 1425    Chlamydia (discrete)  Negative  11/06/24 1425    RPR  Non Reactive  03/26/25 1426       Non Reactive  11/06/24 1425    Syphils cascade: TP-Ab (FTA)       TP-Ab       TP-Ab (EIA)       TPPA       HBsAg  Negative  11/06/24 1425    Herpes Simplex Virus PCR       Herpes Simplex VIrus Culture       HIV  Non Reactive  11/06/24 1425    Hep C RNA Quant PCR       Hep C Antibody  Non Reactive  11/06/24 1425    AFP       NIPT       Cystic Fibrosis (Dilshad)       Cystic Fibroisis        Spinal Muscular atrophy       Fragile X       Group B Strep  Positive  05/14/25 1130    GBS Susceptibility to Clindamycin       GBS Susceptibility to Erythromycin       Fetal Fibronectin       Genetic Testing, Maternal Blood                 Drug Screening       Test Value Date Time    Urine Drug Screen       Amphetamine Screen  Negative ng/mL 11/06/24 1425    Barbiturate Screen  Negative ng/mL 11/06/24 1425    Benzodiazepine Screen  Negative ng/mL 11/06/24 1425    Methadone Screen  Negative ng/mL 11/06/24 1425    Phencyclidine Screen  Negative ng/mL 11/06/24 1425    Opiates Screen       THC Screen       Cocaine Screen       Propoxyphene Screen  Negative ng/mL 11/06/24 1425    Buprenorphine Screen       Methamphetamine Screen       Oxycodone Screen       Tricyclic Antidepressants Screen                 Legend    ^: Historical                                Lab Results   Component Value Date    ALKPHOS 147 (H) 05/25/2025    ALT 8 05/25/2025    AST 23 05/25/2025    CREATININE 0.49 (L) 05/25/2025    BILITOT 0.4 05/25/2025     (H) 05/25/2025    URICACID 3.4 05/25/2025     Lab Results   Component Value Date    WBC 12.99 (H) 05/25/2025    HGB 9.9 (L) 05/25/2025    HCT 30.4 (L) 05/25/2025    MCV 81.9 05/25/2025     05/25/2025        Results for orders placed in visit on 04/17/25     Ob Follow Up Transabdominal  Approach    Narrative  PAT NAME: NOAH DEAN  [facility] REC#: [id number]  BIRTH DA: 1990  PAT GEND: F  ACCOUNT#: [id number]  PAT TYPE: O  EXAM TIMMY: [id number]  REF PHYS AMAYA CAROLINA  ACCESSION [id number]      Sonographer Comments  ====================    AC @ 96%, BPP  observed today      Indication  ========    PIH      Comparison Studies  =================    The findings of this study are compared to the prior ultrasound study dated 25      Method  =======    [place] E6, Transabdominal ultrasound examination      Pregnancy  =========    Pham pregnancy. Number of fetuses: 1      Dating  ======    Method of dating: based on the LMP  LMP on: 2024  GA by LMP 33 w + 0 d  STANISLAV by LMP: 2025  GA by prior assessment 33 w + 0 d  STANISLAV by prior assessment: 2025  Ultrasound examination on: 2025  GA by U/S based upon: AC, BPD, Femur, HC  GA by U/S 33 w + 6 d  STANISLAV by U/S: 2025  Previous dating: based on the LMP, selected on 2025  Agreed STANISLAV of previous datin2025  Assigned: based on the LMP, selected on 2025  Assigned GA 33 w + 0 d  Assigned STANISLAV: 2025  Pregnancy length 280 d      General Evaluation  ================    Cardiac activity present.  bpm.  Fetal movements visualized.  Presentation cephalic.  Placenta Placental site: posterior.  Umbilical cord Cord vessels: 3 vessel cord.  Amniotic fluid Amount of AF: normal. MVP 5.8 cm. RICHA 20.0 cm. Q1 5.6 cm, Q2 5.8 cm, Q3 5.6 cm, Q4 3.0 cm.      Biophysical Profile  ===============    2: Fetal breathing movements  2: Gross body movements  2: Fetal tone  2: Amniotic fluid volume   Biophysical profile score      Fetal Biometry  ============    Standard  BPD 82.7 mm  33w 2d        52%        Hadlock    .2 mm  34w 3d        49%        Hadlock    .3 mm  35w 2d        96%        Hadlock    Femur 62.9 mm  32w 4d        27%        Hadlock    HC / AC 0.98    EFW 2,383 g  65%        Kg    EFW (lb) 5  lb  EFW (oz) 4 oz  EFW by: Hadlock (BPD-HC-AC-FL)  Other: An ultrasound for fetal weight has a margin of error of up to twenty percent.  Extended   4.8 mm  Extremities / Bony Struc  FL / BPD 0.76    FL / HC 0.20    FL / AC 0.20    Other Structures   bpm      Fetal Anatomy  ============    Lateral ventricles: Appears normal  Lips: Appear normal  Nose: Appears normal  4-chamber view: Appears normal  Stomach: Appears normal  Kidneys: Appears normal  Bladder: Appears normal  Gender: female      Impression  ==========    There is appropriate interval fetal growth & normal amniotic fluid quantity, with AC measuring 96%ile.?Normal BPP      Recommendation  ===============    Return for follow-up as clinically indicated.        Sonographer: Lizabeth Pierre RDMS  Physician: Danya Lim MD, FACOG    Electronically signed by: Danya Lim MD, FACOG at:  12:00              Patient Active Problem List    Diagnosis Date Noted    *Gestational hypertension without significant proteinuria 2025    Pregnancy 2024     Note Last Updated: 2025     2 prev  term  NIPT low risk  EFW 33 wks 65%ile, AC 96%ile.       History of pregnancy induced hypertension 2024     Note Last Updated: 2024     Baby asa  With G1      Rh negative, antepartum 2024    History of herpes genitalis 2024     Note Last Updated: 2024     Suppression 36 wks          Assessment & Plan     ASSESSMENT  IUP at 38w4d  Gestational hypertension  induction of labor   3. GBBS positive    PLAN  Admit to labor and delivery  PCN   2.  hilda Larsen MD  2025@

## 2025-05-27 LAB
BASOPHILS # BLD AUTO: 0.05 10*3/MM3 (ref 0–0.2)
BASOPHILS NFR BLD AUTO: 0.4 % (ref 0–1.5)
DEPRECATED RDW RBC AUTO: 45.5 FL (ref 37–54)
EOSINOPHIL # BLD AUTO: 0.21 10*3/MM3 (ref 0–0.4)
EOSINOPHIL NFR BLD AUTO: 1.7 % (ref 0.3–6.2)
ERYTHROCYTE [DISTWIDTH] IN BLOOD BY AUTOMATED COUNT: 14.6 % (ref 12.3–15.4)
HCT VFR BLD AUTO: 26.8 % (ref 34–46.6)
HGB BLD-MCNC: 8.3 G/DL (ref 12–15.9)
IMM GRANULOCYTES # BLD AUTO: 0.14 10*3/MM3 (ref 0–0.05)
IMM GRANULOCYTES NFR BLD AUTO: 1.2 % (ref 0–0.5)
LYMPHOCYTES # BLD AUTO: 2.44 10*3/MM3 (ref 0.7–3.1)
LYMPHOCYTES NFR BLD AUTO: 20.3 % (ref 19.6–45.3)
MCH RBC QN AUTO: 26.8 PG (ref 26.6–33)
MCHC RBC AUTO-ENTMCNC: 31 G/DL (ref 31.5–35.7)
MCV RBC AUTO: 86.5 FL (ref 79–97)
MONOCYTES # BLD AUTO: 1.01 10*3/MM3 (ref 0.1–0.9)
MONOCYTES NFR BLD AUTO: 8.4 % (ref 5–12)
NEUTROPHILS NFR BLD AUTO: 68 % (ref 42.7–76)
NEUTROPHILS NFR BLD AUTO: 8.17 10*3/MM3 (ref 1.7–7)
NRBC BLD AUTO-RTO: 0 /100 WBC (ref 0–0.2)
PLATELET # BLD AUTO: 193 10*3/MM3 (ref 140–450)
PMV BLD AUTO: 10.3 FL (ref 6–12)
RBC # BLD AUTO: 3.1 10*6/MM3 (ref 3.77–5.28)
WBC NRBC COR # BLD AUTO: 12.02 10*3/MM3 (ref 3.4–10.8)

## 2025-05-27 PROCEDURE — 85025 COMPLETE CBC W/AUTO DIFF WBC: CPT | Performed by: OBSTETRICS & GYNECOLOGY

## 2025-05-27 PROCEDURE — 0503F POSTPARTUM CARE VISIT: CPT

## 2025-05-27 RX ORDER — FERROUS SULFATE 325(65) MG
325 TABLET ORAL
Status: DISCONTINUED | OUTPATIENT
Start: 2025-05-27 | End: 2025-05-28 | Stop reason: HOSPADM

## 2025-05-27 RX ADMIN — DOCUSATE SODIUM 100 MG: 100 CAPSULE, LIQUID FILLED ORAL at 09:13

## 2025-05-27 RX ADMIN — IBUPROFEN 600 MG: 600 TABLET ORAL at 09:13

## 2025-05-27 RX ADMIN — WITCH HAZEL: 500 SOLUTION RECTAL; TOPICAL at 16:28

## 2025-05-27 RX ADMIN — IBUPROFEN 600 MG: 600 TABLET ORAL at 20:02

## 2025-05-27 RX ADMIN — DOCUSATE SODIUM 100 MG: 100 CAPSULE, LIQUID FILLED ORAL at 20:02

## 2025-05-27 RX ADMIN — HYDROCODONE BITARTRATE AND ACETAMINOPHEN 1 TABLET: 5; 325 TABLET ORAL at 02:53

## 2025-05-27 NOTE — PROGRESS NOTES
Postpartum Progress Note    Patient name: Elma Lima  YOB: 1990   MRN: [id number]  Referring Provider: Danya Lim MD  Admission Date: 2025  Date of Service: 2025    ID: 34 y.o.     Diagnosis:   S/p vaginal delivery     Gestational hypertension without significant proteinuria     (spontaneous vaginal delivery)       Subjective:      No complaints.  Moderate lochia.  Ambulating, voiding, tolerating diet.  Pain well controlled.  The patient is currently breastfeeding.   This baby is a female.    Objective:      Vital signs:  Vital Signs Range for the last 24 hours  Temperature: Temp:  [96.5 °F (35.8 °C)-98.7 °F (37.1 °C)] 97.7 °F (36.5 °C)   Temp Source: Temp src: Oral   BP: BP: (116-156)/(67-89) 131/87   Pulse: Heart Rate:  [] 81   Respirations: Resp:  [16-20] 18   Weight: 80.7 kg (178 lb)     General: Alert & oriented x4, in no apparent distress  Abdomen: soft, nontender  Uterus: firm, nontender  Extremities: nontender; no edema      Labs:  Lab Results   Component Value Date    WBC 12.02 (H) 2025    HGB 8.3 (L) 2025    HCT 26.8 (L) 2025    MCV 86.5 2025     2025     Results from last 7 days   Lab Units 25  1829   ABO TYPING  A   RH TYPING  Negative     External Prenatal Results       Pregnancy Outside Results - Transcribed From Office Records - See Scanned Records For Details       Test Value Date Time    ABO  A  25 182    Rh  Negative  25 1829    Antibody Screen  Positive  25       Negative  25 1426       Negative  24 142    Varicella IgG       Rubella  2.62 index 24 142    Hgb  8.3 g/dL 25 0450       9.9 g/dL 25       10.4 g/dL 25 1426       13.2 g/dL 24 1425    Hct  26.8 % 25 0450       30.4 % 05/25/25 2157       31.3 % 25 1426       39.7 % 24 1425    HgB A1c        1h GTT  109 mg/dL 25 1426    3h GTT Fasting       3h GTT 1  hour       3h GTT 2 hour       3h GTT 3 hour        Gonorrhea (discrete)  Negative  11/06/24 1425    Chlamydia (discrete)  Negative  11/06/24 1425    RPR  Non Reactive  03/26/25 1426       Non Reactive  11/06/24 1425    Syphils cascade: TP-Ab (FTA)  Non-Reactive  05/25/25 2157    TP-Ab  Non-Reactive  05/25/25 2157    TP-Ab (EIA)       TPPA       HBsAg  Negative  11/06/24 1425    Herpes Simplex Virus PCR       Herpes Simplex VIrus Culture       HIV  Non Reactive  11/06/24 1425    Hep C RNA Quant PCR       Hep C Antibody  Non Reactive  11/06/24 1425    AFP       NIPT       Cystic Fibrosis (Dilshad)       Cystic Fibroisis        Spinal Muscular atrophy       Fragile X       Group B Strep  Positive  05/14/25 1130    GBS Susceptibility to Clindamycin       GBS Susceptibility to Erythromycin       Fetal Fibronectin       Genetic Testing, Maternal Blood                 Drug Screening       Test Value Date Time    Urine Drug Screen       Amphetamine Screen  Negative ng/mL 11/06/24 1425    Barbiturate Screen  Negative ng/mL 11/06/24 1425    Benzodiazepine Screen  Negative ng/mL 11/06/24 1425    Methadone Screen  Negative ng/mL 11/06/24 1425    Phencyclidine Screen  Negative ng/mL 11/06/24 1425    Opiates Screen       THC Screen       Cocaine Screen       Propoxyphene Screen  Negative ng/mL 11/06/24 1425    Buprenorphine Screen       Methamphetamine Screen       Oxycodone Screen       Tricyclic Antidepressants Screen                 Legend    ^: Historical                            Assessment/Plan:      PPD#1 s/p vaginal delivery. Doing well.  Chronic anemia exacerbated by delivery. VSS.   MBT Rh Neg, BBT Rh Pos  Stage 1 GHTN without medication. Asymptomatic. PEP 5/25 satisfactory.     Plan:   Continue routine postpartum care. Advance.   Anemia- Continue PNV. Begin PO iron with constipation precautions.   AM CBC to f/u anemia.   Give Rhogam  Continue to monitor for s/sx of preeclampsia. Notify provider for BP >/=140/90. AM  PEP zechariah.     Jessica Maurer, APRN.  05/27/25

## 2025-05-28 VITALS
BODY MASS INDEX: 30.39 KG/M2 | HEIGHT: 64 IN | SYSTOLIC BLOOD PRESSURE: 135 MMHG | RESPIRATION RATE: 18 BRPM | DIASTOLIC BLOOD PRESSURE: 79 MMHG | WEIGHT: 178 LBS | HEART RATE: 77 BPM | OXYGEN SATURATION: 100 % | TEMPERATURE: 97.7 F

## 2025-05-28 LAB
ALP SERPL-CCNC: 121 U/L (ref 39–117)
ALT SERPL W P-5'-P-CCNC: 9 U/L (ref 1–33)
AST SERPL-CCNC: 26 U/L (ref 1–32)
BILIRUB SERPL-MCNC: <0.2 MG/DL (ref 0–1.2)
CREAT SERPL-MCNC: 0.45 MG/DL (ref 0.57–1)
DEPRECATED RDW RBC AUTO: 45.4 FL (ref 37–54)
ERYTHROCYTE [DISTWIDTH] IN BLOOD BY AUTOMATED COUNT: 14.6 % (ref 12.3–15.4)
HCT VFR BLD AUTO: 29.9 % (ref 34–46.6)
HGB BLD-MCNC: 9.3 G/DL (ref 12–15.9)
LDH SERPL-CCNC: 206 U/L (ref 135–214)
MCH RBC QN AUTO: 26.8 PG (ref 26.6–33)
MCHC RBC AUTO-ENTMCNC: 31.1 G/DL (ref 31.5–35.7)
MCV RBC AUTO: 86.2 FL (ref 79–97)
PLATELET # BLD AUTO: 199 10*3/MM3 (ref 140–450)
PMV BLD AUTO: 10 FL (ref 6–12)
RBC # BLD AUTO: 3.47 10*6/MM3 (ref 3.77–5.28)
URATE SERPL-MCNC: 3 MG/DL (ref 2.4–5.7)
WBC NRBC COR # BLD AUTO: 10.08 10*3/MM3 (ref 3.4–10.8)

## 2025-05-28 PROCEDURE — 84450 TRANSFERASE (AST) (SGOT): CPT

## 2025-05-28 PROCEDURE — 0503F POSTPARTUM CARE VISIT: CPT | Performed by: NURSE PRACTITIONER

## 2025-05-28 PROCEDURE — 84075 ASSAY ALKALINE PHOSPHATASE: CPT

## 2025-05-28 PROCEDURE — 84550 ASSAY OF BLOOD/URIC ACID: CPT

## 2025-05-28 PROCEDURE — 82247 BILIRUBIN TOTAL: CPT

## 2025-05-28 PROCEDURE — 83615 LACTATE (LD) (LDH) ENZYME: CPT

## 2025-05-28 PROCEDURE — 85027 COMPLETE CBC AUTOMATED: CPT

## 2025-05-28 PROCEDURE — 82565 ASSAY OF CREATININE: CPT

## 2025-05-28 PROCEDURE — 84460 ALANINE AMINO (ALT) (SGPT): CPT

## 2025-05-28 RX ADMIN — ACETAMINOPHEN 650 MG: 325 TABLET ORAL at 08:21

## 2025-05-28 RX ADMIN — FERROUS SULFATE TAB 325 MG (65 MG ELEMENTAL FE) 325 MG: 325 (65 FE) TAB at 08:21

## 2025-05-28 RX ADMIN — DOCUSATE SODIUM 100 MG: 100 CAPSULE, LIQUID FILLED ORAL at 08:21

## 2025-05-28 RX ADMIN — IBUPROFEN 600 MG: 600 TABLET ORAL at 05:05

## 2025-05-28 NOTE — PLAN OF CARE
Goal Outcome Evaluation:  Plan of Care Reviewed With: patient, spouse        Progress: improving  Outcome Evaluation: VSS. Voiding without dificulty. Ambulating. Lochia light, fundus firm. Awaiting discharge home today

## 2025-05-28 NOTE — DISCHARGE SUMMARY
Discharge Summary    Date of Admission: 2025  Date of Discharge:  2025      Patient: Elma Lima      MR#:[id number]    Delivery Provider: Lyudmila Larsen    Attending Provider: Danya Lim MD    Presenting Problem/History of Present Illness  Gestational hypertension without significant proteinuria [O13.9]       Gestational hypertension without significant proteinuria     (spontaneous vaginal delivery)         Discharge Diagnosis: Vaginal delivery at 38w4d    Procedures:  Vaginal, Spontaneous    2025   1:25 PM       Discharge Date: 2025;     Hospital Course  Patient is a 34 y.o. female  at 38w4d status post vaginal delivery without complication. Postpartum the patient did well. She remained afebrile, with vital signs stable. She was ready for discharge on postpartum day 2.     Infant:   female fetus 3555 g (7 lb 13.4 oz) with Apgar scores of 9 , 9  at five minutes.    Condition on Discharge:  Stable    Vital Signs  Temp:  [97.7 °F (36.5 °C)-98.9 °F (37.2 °C)] 97.7 °F (36.5 °C)  Heart Rate:  [77-87] 77  Resp:  [16-18] 18  BP: (121-135)/(71-85) 135/79    Lab Results   Component Value Date    WBC 10.08 2025    HGB 9.3 (L) 2025    HCT 29.9 (L) 2025    MCV 86.2 2025     2025       Discharge Disposition  Home or Self Care    Discharge Medications     Discharge Medications        Continue These Medications        Instructions Start Date   ferrous sulfate 325 (65 FE) MG tablet   325 mg, Daily With Breakfast      PRENATAL VITAMIN PO   Take  by mouth.             Stop These Medications      valACYclovir 500 MG tablet  Commonly known as: Valtrex              Discharge Diet:     Activity at Discharge:   Activity Instructions       Activity as Tolerated      Gradually Increase Activity Until at Pre-Hospitalization Level      Pelvic Rest      Sexual Activity Restrictions      Type of Restriction: Sex    Explain Sexual Activity Restrictions: Nothing  in the vagina for 6 weeks    Work Restrictions      Type of Restriction: Work    May Return to Work: After Next Appointment    With / Without Restrictions: Without Restrictions            Follow-up Appointments  No future appointments.  Additional Instructions for the Follow-ups that You Need to Schedule       Call MD With Problems / Concerns   As directed      Instructions: Call for temp >/= 100.4, severe pain, severe bleeding, headache that does not improve with rest, medication, blurred vision, or postpartum depression. Monitor incision for signs of infection including, foul odor, discharge or separation of the wound.  Call for blood clots larger than a golf ball or saturating a pad in less than an hour.    Order Comments: Instructions: Call for temp >/= 100.4, severe pain, severe bleeding, headache that does not improve with rest, medication, blurred vision, or postpartum depression. Monitor incision for signs of infection including, foul odor, discharge or separation of the wound.  Call for blood clots larger than a golf ball or saturating a pad in less than an hour.         Discharge Follow-up with Specified Provider: Dr. Lim or yossi YARBROUGH; 6 Weeks   As directed      To: Dr. Lim or yossi YARBROUGH   Follow Up: 6 Weeks                ZENON Skelton  05/28/25  10:06 EDT  Cs[facility]

## 2025-05-28 NOTE — PLAN OF CARE
Goal Outcome Evaluation:           Progress: improving  Outcome Evaluation: VSS, voids, 2nd degree with repair, sl. swelling, has hemorrhoids, taking motrin for pain control.

## 2025-05-29 ENCOUNTER — MATERNAL SCREENING (OUTPATIENT)
Dept: CALL CENTER | Facility: HOSPITAL | Age: 35
End: 2025-05-29
Payer: COMMERCIAL

## 2025-05-29 NOTE — OUTREACH NOTE
Maternal Screening Survey      Flowsheet Row Responses   Eligibility Eligible   Prep survey completed? Yes   Facility patient discharged from? [facility] - Registered Nurse              [facility] - Registered Nurse

## 2025-06-06 ENCOUNTER — MATERNAL SCREENING (OUTPATIENT)
Dept: CALL CENTER | Facility: HOSPITAL | Age: 35
End: 2025-06-06
Payer: COMMERCIAL

## 2025-06-06 NOTE — OUTREACH NOTE
Maternal Screening Survey      Flowsheet Row Responses   Facility patient discharged from? [facility]   Attempt successful? No   Unsuccessful attempts Attempt 2              [facility] - Registered Nurse

## 2025-06-06 NOTE — OUTREACH NOTE
Maternal Screening Survey      Flowsheet Row Responses   Facility patient discharged from? [facility]   Attempt successful? No   Unsuccessful attempts Attempt 1              [facility] - Registered Nurse

## 2025-06-07 ENCOUNTER — MATERNAL SCREENING (OUTPATIENT)
Dept: CALL CENTER | Facility: HOSPITAL | Age: 35
End: 2025-06-07
Payer: COMMERCIAL

## 2025-06-07 NOTE — OUTREACH NOTE
Maternal Screening Survey      Flowsheet Row Responses   Facility patient discharged from? [place]   Attempt successful? No   Unsuccessful attempts Attempt 3   Revoke Unable to reach              [facility] - Registered Nurse              [facility] - Registered Nurse

## 2025-07-09 ENCOUNTER — POSTPARTUM VISIT (OUTPATIENT)
Dept: OBSTETRICS AND GYNECOLOGY | Facility: CLINIC | Age: 35
End: 2025-07-09
Payer: COMMERCIAL

## 2025-07-09 VITALS
WEIGHT: 155 LBS | HEIGHT: 64 IN | DIASTOLIC BLOOD PRESSURE: 80 MMHG | BODY MASS INDEX: 26.46 KG/M2 | SYSTOLIC BLOOD PRESSURE: 118 MMHG

## 2025-07-09 DIAGNOSIS — Z12.4 CERVICAL CANCER SCREENING: Primary | ICD-10-CM

## 2025-07-09 PROBLEM — O26.899 RH NEGATIVE, ANTEPARTUM: Status: RESOLVED | Noted: 2024-11-06 | Resolved: 2025-07-09

## 2025-07-09 PROBLEM — Z87.59 HISTORY OF PREGNANCY INDUCED HYPERTENSION: Status: RESOLVED | Noted: 2024-11-06 | Resolved: 2025-07-09

## 2025-07-09 PROBLEM — Z34.90 PREGNANCY: Status: RESOLVED | Noted: 2024-11-06 | Resolved: 2025-07-09

## 2025-07-09 PROBLEM — Z86.19 HISTORY OF HERPES GENITALIS: Status: RESOLVED | Noted: 2024-11-06 | Resolved: 2025-07-09

## 2025-07-09 PROBLEM — O13.9 GESTATIONAL HYPERTENSION WITHOUT SIGNIFICANT PROTEINURIA: Status: RESOLVED | Noted: 2025-05-25 | Resolved: 2025-07-09

## 2025-07-09 PROBLEM — Z67.91 RH NEGATIVE, ANTEPARTUM: Status: RESOLVED | Noted: 2024-11-06 | Resolved: 2025-07-09

## 2025-07-09 NOTE — PROGRESS NOTES
"        Chief Complaint   Patient presents with    Routine Prenatal Visit       Postpartum Visit         Elma Lima is a 34 y.o.  who presents today for a 6 week(s) postpartum check.     C/S: no     Vaginal, Spontaneous   Information for the patient's :  Naty Lima [[id number]]   [birth year]25   female   Naty Lima   3555 g (7 lb 13.4 oz)   Gestational Age: 38w4d       Baby Discharged: Discharged with Mom  Delivering Physician: Lyudmila Larsen MD    Her pregnancy was complicated by gestational HTN. The laceration was 2nd degree and is healing well. Patient describes vaginal bleeding as absent.  Patient is bottle feeding.  She desires Paraguard for contraception.      She would like to discuss the following complaints today: none.    Patient denies concerns for postpartum depression/anxiety. Patient denies  suicidal or homicidal ideation. Her postpartum depression screening questionnaire: Was completed, and she scored a 0. No treatment is indicated      Last Pap : 2022. Results: negative. HPV: negative.   Last Completed Pap Smear    This patient has no relevant Health Maintenance data.           The additional following portions of the patient's history were reviewed and updated as appropriate: allergies, current medications, past family history, past medical history, past social history, past surgical history, and problem list.    Review of Systems  All other systems reviewed and are negative.     I have reviewed and agree with the HPI, ROS, and historical information as entered above. Danya Lim MD      /80   Ht 162.6 cm (64\")   Wt 70.3 kg (155 lb)   LMP 2024   Breastfeeding No   BMI 26.61 kg/m²     Physical Exam  Vitals and nursing note reviewed. Exam conducted with a chaperone present.   Constitutional:       Appearance: She is well-developed.   HENT:      Head: Normocephalic and atraumatic.   Neck:      Thyroid: No thyroid mass or thyromegaly.   Pulmonary:      " Breath sounds: No rhonchi.   Abdominal:      Palpations: Abdomen is soft. Abdomen is not rigid. There is no mass.      Tenderness: There is no abdominal tenderness. There is no guarding.      Hernia: No hernia is present.   Genitourinary:     Vagina: Normal.      Cervix: Normal.      Uterus: Normal.    Musculoskeletal:      Cervical back: Normal range of motion. No muscular tenderness.   Neurological:      Mental Status: She is alert and oriented to person, place, and time.   Psychiatric:         Behavior: Behavior normal.             Assessment and Plan    Problem List Items Addressed This Visit    None  Visit Diagnoses         Cervical cancer screening    -  Primary    Relevant Orders    LIQUID-BASED PAP SMEAR WITH HPV GENOTYPING REGARDLESS OF INTERPRETATION ([facility])      Postpartum follow-up        Relevant Orders    LIQUID-BASED PAP SMEAR WITH HPV GENOTYPING REGARDLESS OF INTERPRETATION ([facility])            S/p Vaginal delivery, 6 week(s) postpartum.  Doing well.    Return to normal physical activity.  No pelvic restrictions.   Baby doing well.  No si/sx of postpartum depression  Contraception: contraceptive methods: IUD.  Insertion date: to be sched  Return in about 2 weeks (around 7/23/2025) for IUD Insertion.     Danya Lim MD  07/09/2025

## 2025-07-10 LAB — REF LAB TEST METHOD: NORMAL

## 2025-07-23 ENCOUNTER — OFFICE VISIT (OUTPATIENT)
Dept: OBSTETRICS AND GYNECOLOGY | Facility: CLINIC | Age: 35
End: 2025-07-23
Payer: COMMERCIAL

## 2025-07-23 VITALS
HEIGHT: 64 IN | BODY MASS INDEX: 27.79 KG/M2 | DIASTOLIC BLOOD PRESSURE: 86 MMHG | WEIGHT: 162.8 LBS | SYSTOLIC BLOOD PRESSURE: 130 MMHG

## 2025-07-23 DIAGNOSIS — Z30.430 ENCOUNTER FOR IUD INSERTION: Primary | ICD-10-CM

## 2025-07-23 LAB
B-HCG UR QL: NEGATIVE
EXPIRATION DATE: NORMAL
INTERNAL NEGATIVE CONTROL: NORMAL
INTERNAL POSITIVE CONTROL: NORMAL
Lab: NORMAL

## 2025-07-23 RX ORDER — COPPER 313.4 MG/1
INTRAUTERINE DEVICE INTRAUTERINE ONCE
Status: COMPLETED | OUTPATIENT
Start: 2025-07-23 | End: 2025-07-23

## 2025-07-23 RX ADMIN — COPPER: 313.4 INTRAUTERINE DEVICE INTRAUTERINE at 17:15

## 2025-07-23 NOTE — PROGRESS NOTES
"     Gynecologic Procedure Note        Procedure: IUD Insertion     Procedures    Pre procedure indication 1) Desires ParaGard  Post procedure indication 1) Desires ParaGard    NDC: Connor  [address]-6  Lot #: [id number]  Exp Date: 10/01/2029  BH device    /86   Ht 162.6 cm (64\")   Wt 73.8 kg (162 lb 12.8 oz)   Breastfeeding No   BMI 27.94 kg/m²       The risks, benefits, and alternatives to ParaGard were explained at length with the patient. All her questions were answered and consents were signed.  Her LMP was before pregnancy .  Urine Pregnancy Test was Negative.  Patient does not have an allergy to betadine or shellfish.    Time out: immediate members of the procedure team and patient agree to the following: correct patient, correct site, correct procedure to be performed. Danya Lim MD      The patient was placed in a dorsal lithotomy position on the examining table in [facility]. A bimanual exam confirmed the uterus was midposition. A speculum was inserted into the vagina and the cervix was brought into view.  The cervix was prepped with Betadine. The anterior lip of the cervix was then grasped with a single-tooth tenaculum. The endometrial cavity was then sounded to 8 centimeters. The sealed ParaGard package was opened and the IUD was removed in a sterile fashion.    The upper edge of the depth setting the flange was set at the uterine sound measurement. The  was then carefully advanced to the cervical canal into the uterus to the level of the fundus.  The slider was then retracted about 1 cm and deployed the device. The device was then gently advanced to the fundus. The IUD was then released by pulling the slider down all the way. The  was removed carefully from the uterus. The threads were then cut leaving 2-3 cm visible outside of the cervix.  The single-tooth tenaculum was removed from the anterior lip. Good hemostasis was noted.   All other instruments were removed from the " vagina.       The patient tolerated the procedure well with a mild amount of discomfort.  She was monitored for 10 minutes prior to discharge.      There were no complications.    The patient was counseled about the need to return in 4 weeks for IUD check.     She was counseled about the need to use a backup method of contraception such as condoms until her post insertion exam was performed. The patient verbalized understanding when the IUD will need to be removed/replaced. Written information was given to the patient.  The patient is counseled to contact us if she has any significant or increasing bleeding, pain, fever, chills, or other concerns. She is instructed to see a doctor right away if she believes that she may be pregnant at any time with the IUD in place.    Return in about 1 month (around 8/23/2025) for U/S & IUD Recheck.      Danya Lim MD  07/23/2025

## 2025-08-20 ENCOUNTER — OFFICE VISIT (OUTPATIENT)
Dept: OBSTETRICS AND GYNECOLOGY | Facility: CLINIC | Age: 35
End: 2025-08-20
Payer: COMMERCIAL

## 2025-08-20 VITALS
BODY MASS INDEX: 28 KG/M2 | DIASTOLIC BLOOD PRESSURE: 62 MMHG | HEIGHT: 64 IN | SYSTOLIC BLOOD PRESSURE: 114 MMHG | WEIGHT: 164 LBS

## 2025-08-20 DIAGNOSIS — Z30.431 IUD CHECK UP: Primary | ICD-10-CM

## 2025-08-20 PROCEDURE — 99213 OFFICE O/P EST LOW 20 MIN: CPT | Performed by: OBSTETRICS & GYNECOLOGY
